# Patient Record
(demographics unavailable — no encounter records)

---
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
## 2022-07-07 NOTE — EVENT NOTE
Date: 07/07/22





Patient reassessed today and is nonverbal sitting in a chair.  Does not respond 

to questioning.  We will keep 1013 in place.

## 2022-07-08 NOTE — ELECTROCARDIOGRAPH REPORT
[facility]

                                       

Test Date:    2022               Test Time:    10:16:39

Pat Name:     ANTONIO TURNER           Department:   

Patient ID:   [id number]          Room:          

Gender:       F                        Technician:   NURSE

:          1963               Requested By: LARRY GRAY

Order Number: [id number]              Reading MD:   Stephanie Grullon

                                 Measurements

Intervals                              Axis          

Rate:         78                       P:            54

CT:           184                      QRS:          -39

QRSD:         92                       T:            4

QT:           402                                    

QTc:          460                                    

                           Interpretive Statements

Sinus rhythm

Left axis deviation

Anteroseptal infarct, age indeterminate

No previous ECG available for comparison

Electronically Signed On 2022 17:13:35 EDT by Stephanie Grullon
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
## 2022-07-08 NOTE — CONSULTATION
History of Present Illness





- Reason for Consult


Consult date: 22


Medical Management


Requesting physician: SOSA QUINTERO





- History of Present Illness


60 YO Female with Obesity Hypoventilation Syndrome, HTN, DM, Bipolar Disorder, 

Schizophrenia, Schizoaffective Disorder Consult placed by Dr. Quintero for 

medical management.  Patient seen and evaluated in the recreation room.  Patient

denies fever, chills, chest pain, palpitation, productive cough, skin rash, rec

ent contact, known exposure to COVID-19.  Patient appears to be at baseline 

level of cognition and function.











Past History


Past Medical History: diabetes, hypertension, other (see hpi)


Past Surgical History: 


Social history: single.  denies: smoking, alcohol abuse, prescription drug abuse


Family history: diabetes, hypertension





Medications and Allergies


                                    Allergies











Allergy/AdvReac Type Severity Reaction Status Date / Time


 


No Known Allergies Allergy   Verified 22 11:13











                                Home Medications











 Medication  Instructions  Recorded  Confirmed  Last Taken  Type


 


Unobtainable  22 Unknown History











Active Meds: 


Active Medications





Insulin Human Lispro (Insulin Lispro 100 Unit/Ml)  0 unit SUB-Q ACHS [facility]; 

Protocol











Review of Systems


Constitutional: no weight loss, no weight gain, no fever, no chills


Ears, nose, mouth and throat: no ear pain, no ear discharge, no decreased 

hearing, no nose pain, no nasal discharge


Breasts: no change in shape, no swelling, no mass


Cardiovascular: no chest pain, no orthopnea, no rapid/irregular heart beat, no 

edema, no syncope


Respiratory: no cough, no excessive sputum, no hemoptysis, no shortness of 

breath, no dyspnea on exertion


Gastrointestinal: no nausea, no vomiting


Genitourinary Female: no pelvic pain, no flank pain, no dysuria, no urinary 

frequency, no urgency


Rectal: no pain, no incontinence, no bleeding


Musculoskeletal: no neck stiffness, no neck pain, no arm numbness/tingling


Integumentary: no rash, no pruritis, no sores


Neurological: no head injury, no transient paralysis, no parathesias, no 

numbness, no seizures, no syncope


Psychiatric: irritability, mood swings, no disorientation, no hallucinations


Endocrine: no cold intolerance, no heat intolerance, no excessive thirst, no 

polydipsia, no excessive sweating


Hematologic/Lymphatic: no easy bruising, no easy bleeding, no lymphadenopathy


Allergic/Immunologic: no urticaria, no angioedema





Exam





- Constitutional


Vitals: 


                                        











Temp Pulse Resp BP Pulse Ox


 


 99.3 F   79   18   131/75   92 


 


 22 09:16  22 09:16  22 09:16  22 09:16  22 09:16











General appearance: Present: obese





- EENT


Eyes: Present: PERRL


ENT: hearing intact, clear oral mucosa





- Neck


Neck: Present: supple, normal ROM





- Respiratory


Respiratory effort: normal


Respiratory: bilateral: CTA





- Cardiovascular


Heart Sounds: Present: S1 & S2.  Absent: rub, click





- Extremities


Extremities: pulses symmetrical, No edema


Peripheral Pulses: within normal limits





- Abdominal


General gastrointestinal: Present: soft, non-tender, non-distended, normal bowel

sounds


Female genitourinary: Present: normal





- Integumentary


Integumentary: Present: clear, warm, dry





- Musculoskeletal


Musculoskeletal: gait normal, strength equal bilaterally





- Psychiatric


Psychiatric: cooperative





- Neurologic


Neurologic: CNII-XII intact, moves all extremities





Results





- Labs


CBC & Chem 7: 


                                 22 06:16





                                 22 06:16


Labs: 


                              Abnormal lab results











  22 Range/Units





  06:16 06:16 06:16 


 


WBC  4.1 L    (4.5-11.0)  K/mm3


 


Lymph % (Auto)  35.4 H    (13.4-35.0)  %


 


Mono % (Auto)  8.9 H    (0.0-7.3)  %


 


Glucose   206 H   ()  mg/dL


 


POC Glucose     ()  mg/dL


 


Hemoglobin A1c    9.1 H  (4-6)  %


 


HDL Cholesterol   37 L   (40-59)  mg/dL














  22 Range/Units





  06:16 11:26 


 


WBC    (4.5-11.0)  K/mm3


 


Lymph % (Auto)    (13.4-35.0)  %


 


Mono % (Auto)    (0.0-7.3)  %


 


Glucose    ()  mg/dL


 


POC Glucose  206 H  221 H  ()  mg/dL


 


Hemoglobin A1c    (4-6)  %


 


HDL Cholesterol    (40-59)  mg/dL














Assessment and Plan





- Patient Problems


(1) HTN (hypertension)


Current Visit: Yes   Status: Acute   


Qualifiers: 


   Hypertension type: primary hypertension   Qualified Code(s): I10 - Essential 

(primary) hypertension   


Plan to address problem: 


Monitor blood pressure every shift, continue medical management.








(2) Diabetes


Current Visit: Yes   Status: Acute   


Plan to address problem: 


Consistent carbohydrate diet, Accu-Chek, insulin protocol, hypoglycemia 

protocol.








(3) Bipolar 1 disorder


Current Visit: Yes   Status: Acute   


Plan to address problem: 


Continue medical management, supportive care, behavior change counseling,








(4) Schizophrenia


Current Visit: Yes   Status: Acute   


Qualifiers: 


   Schizophrenia type: unspecified   Qualified Code(s): F20.9 - Schizophrenia, 

unspecified   


Plan to address problem: 


Continue medical management, supportive care.








(5) Advance care planning


Current Visit: Yes   Status: Acute   


Plan to address problem: 


Disease education done, care plan discussed, diagnoses discussed, prognosis 

discussed, patient is full code.  Patient acknowledges understanding and 

agreement with care plan, +30 minutes.








(6) Preventative health care


Current Visit: Yes   Status: Acute   


Plan to address problem: 


Patient counseled regarding balanced diet, increase physical activity at 

discharge, consistent carbohydrate diet, weight reduction, patient instructed to

follow-up with primary care physician regarding all age and risk factor 

appropriate screening test.  +30 minutes.

## 2022-07-09 NOTE — PROGRESS NOTE
Subjective


Date of service: 07/09/22


Principal diagnosis: Bipolar, schizophrenia


Subjective Comment: 





Date of service: 07/09/22


Principal diagnosis: Bipolar, schizophrenia


Subjective Comment:





 Patient seen today in her room. Patient states that she will not take her 

medications because "Zack told me not to". Patient is also refusing to have her

blood sugar checked, and has been aggressive towards the staff. When asked why 

she wasnt taking her meds she replied " Not taking my medication satan". After 

that she refused to answer any more questions. No new changes at this time.





Medications and Allergies


                                    Allergies











Allergy/AdvReac Type Severity Reaction Status Date / Time


 


No Known Allergies Allergy   Verified 07/06/22 11:13











                                Home Medications











 Medication  Instructions  Recorded  Confirmed  Last Taken  Type


 


Bupropion HCl [Wellbutrin XL] 300 mg PO QAM 07/08/22 07/08/22 Unknown History


 


Divalproex Dr [DepaKOTE DR] 500 mg PO QA 07/08/22 07/08/22 Unknown History


 


Doxepin HCl [Silenor] 6 mg PO QHS 07/08/22 07/08/22 Unknown History


 


FLUoxetine [PROzac] 20 mg PO QDAY 07/08/22 07/08/22 Unknown History


 


diphenhydrAMINE HCL [Allergy] 25 mg PO HS 07/08/22 07/08/22 Unknown History


 


metFORMIN [Glucophage] 500 mg PO BID 07/08/22 07/08/22 Unknown History


 


propranoloL [Inderal] 10 mg PO BID 07/08/22 07/08/22 Unknown History


 


risperiDONE [RisperDAL] 2 mg PO BID 07/08/22 07/08/22 Unknown History


 


traZODone [Desyrel] 100 mg PO QHS 07/08/22 07/08/22 Unknown History











Active Meds: 


Active Medications





Bupropion HCl (Bupropion Xl 150 Mg Tab)  300 mg PO QDAY [facility]


   Last Admin: 07/09/22 10:30 Dose:  Not Given


   


Divalproex Sodium (Divalproex Dr 500 Mg Tab)  [facility]


   Last Admin: 07/09/22 10:30 Dose:  Not Given


   


Fluoxetine HCl (Fluoxetine 20 Mg Cap)  20 mg PO QDAY [facility]


   Last Admin: 07/09/22 10:30 Dose:  Not Given


   


Haloperidol Lactate (Haloperidol Lactate 5 Mg/1 Ml Inj)  5 mg IM Q6H PRN


   PRN Reason: Agitation


   Last Admin: 07/09/22 16:40 Dose:  5 mg


   


Insulin Human Lispro (Insulin Lispro 100 Unit/Ml)  0 unit SUB-Q ACHS [facility]; 

Protocol


   Last Admin: 07/09/22 16:46 Dose:  Not Given


   


Lorazepam (Lorazepam 2 Mg/Ml Vial)  2 mg IM Q6H PRN


   PRN Reason: Agitation


   Last Admin: 07/09/22 16:42 Dose:  2 mg


   


Metformin HCl (Metformin 500 Mg Tab)  500 mg PO BIDDIAB [facility]


   Last Admin: 07/09/22 16:46 Dose:  Not Given


   


Propranolol HCl (Propranolol 10 Mg Tab)  10 mg PO Q12HR [facility]


   Last Admin: 07/09/22 10:30 Dose:  Not Given


   


Risperidone (Risperidone 1 Mg Tab)  2 mg PO BID [facility]


   Last Admin: 07/09/22 10:30 Dose:  Not Given


   


Trazodone HCl (Trazodone 50 Mg Tab)  50 mg PO QHS PRN


   PRN Reason: Anxiety


Trazodone HCl (Trazodone 100 Mg Tab)  100 mg PO QHS [facility]


   Last Admin: 07/08/22 21:38 Dose:  Not Given


   











Results





- Results


Labs/Vitals: 


                             Laboratory Last Values











WBC  4.1 K/mm3 (4.5-11.0)  L  07/08/22  06:16    


 


RBC  4.51 M/mm3 (3.65-5.03)   07/08/22  06:16    


 


Hgb  13.4 gm/dl (10.1-14.3)   07/08/22  06:16    


 


Hct  40.9 % (30.3-42.9)   07/08/22  06:16    


 


MCV  91 fl (79-97)   07/08/22  06:16    


 


MCH  30 pg (28-32)   07/08/22  06:16    


 


MCHC  33 % (30-34)   07/08/22  06:16    


 


RDW  13.3 % (13.2-15.2)   07/08/22  06:16    


 


Plt Count  172 K/mm3 (140-440)   07/08/22  06:16    


 


Lymph % (Auto)  35.4 % (13.4-35.0)  H  07/08/22  06:16    


 


Mono % (Auto)  8.9 % (0.0-7.3)  H  07/08/22  06:16    


 


Eos % (Auto)  2.2 % (0.0-4.3)   07/08/22  06:16    


 


Baso % (Auto)  1.2 % (0.0-1.8)   07/08/22  06:16    


 


Lymph # (Auto)  1.4 K/mm3 (1.2-5.4)   07/08/22  06:16    


 


Mono # (Auto)  0.4 K/mm3 (0.0-0.8)   07/08/22  06:16    


 


Eos # (Auto)  0.1 K/mm3 (0.0-0.4)   07/08/22  06:16    


 


Baso # (Auto)  0.0 K/mm3 (0.0-0.1)   07/08/22  06:16    


 


Seg Neutrophils %  52.3 % (40.0-70.0)   07/08/22  06:16    


 


Seg Neutrophils #  2.1 K/mm3 (1.8-7.7)   07/08/22  06:16    


 


Sodium  138 mmol/L (137-145)   07/08/22  06:16    


 


Potassium  3.8 mmol/L (3.6-5.0)   07/08/22  06:16    


 


Chloride  103.7 mmol/L ()   07/08/22  06:16    


 


Carbon Dioxide  24 mmol/L (22-30)   07/08/22  06:16    


 


Anion Gap  14 mmol/L  07/08/22  06:16    


 


BUN  11 mg/dL (7-17)   07/08/22  06:16    


 


Creatinine  0.6 mg/dL (0.6-1.2)   07/08/22  06:16    


 


Estimated GFR  > 60 ml/min  07/08/22  06:16    


 


BUN/Creatinine Ratio  18 %  07/08/22  06:16    


 


Glucose  206 mg/dL ()  H  07/08/22  06:16    


 


POC Glucose  197 mg/dL ()  H  07/09/22  10:03    


 


Hemoglobin A1c  9.1 % (4-6)  H  07/08/22  06:16    


 


Calcium  8.9 mg/dL (8.4-10.2)   07/08/22  06:16    


 


Total Bilirubin  0.60 mg/dL (0.1-1.2)   07/08/22  06:16    


 


AST  20 units/L (5-40)   07/08/22  06:16    


 


ALT  27 units/L (7-56)   07/08/22  06:16    


 


Alkaline Phosphatase  47 units/L ()   07/08/22  06:16    


 


Total Protein  6.4 g/dL (6.3-8.2)   07/08/22  06:16    


 


Albumin  4.3 g/dL (3.9-5)   07/08/22  06:16    


 


Albumin/Globulin Ratio  2.0 %  07/08/22  06:16    


 


Triglycerides  142 mg/dL (2-149)   07/08/22  06:16    


 


Cholesterol  144 mg/dL ()   07/08/22  06:16    


 


LDL Cholesterol Direct  96 mg/dL ()   07/08/22  06:16    


 


HDL Cholesterol  37 mg/dL (40-59)  L  07/08/22  06:16    


 


Cholesterol/HDL Ratio  3.89 %  07/08/22  06:16    


 


TSH  0.672 mlU/mL (0.270-4.200)   07/08/22  06:16    








                                Last Vital Signs











Temp  99.3 F   07/08/22 09:16


 


Pulse  79   07/08/22 09:16


 


Resp  18   07/08/22 09:16


 


BP  131/75   07/08/22 09:16


 


Pulse Ox  92   07/08/22 09:16
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
## 2022-07-10 NOTE — PROGRESS NOTE
Assessment and Plan





- Patient Problems


(1) HTN (hypertension)


Current Visit: Yes   Status: Acute   


Qualifiers: 


   Hypertension type: primary hypertension   Qualified Code(s): I10 - Essential 

(primary) hypertension   


Plan to address problem: 


Monitor blood pressure every shift, continue medical management.








(2) Diabetes


Current Visit: Yes   Status: Acute   


Plan to address problem: 


Consistent carbohydrate diet, Accu-Chek, insulin protocol, hypoglycemia 

protocol.








(3) Bipolar 1 disorder


Current Visit: Yes   Status: Acute   


Plan to address problem: 


Continue medical management, supportive care, behavior change counseling,








(4) Schizophrenia


Current Visit: Yes   Status: Acute   


Qualifiers: 


   Schizophrenia type: unspecified   Qualified Code(s): F20.9 - Schizophrenia, 

unspecified   


Plan to address problem: 


Continue medical management, supportive care.








(5) Advance care planning


Current Visit: Yes   Status: Acute   


Plan to address problem: 


Disease education done, care plan discussed, diagnoses discussed, prognosis 

discussed, patient is full code.  Patient acknowledges understanding and 

agreement with care plan, +30 minutes.








(6) Preventative health care


Current Visit: Yes   Status: Acute   


Plan to address problem: 


Patient counseled regarding balanced diet, increase physical activity at 

discharge, consistent carbohydrate diet, weight reduction, patient instructed to

follow-up with primary care physician regarding all age and risk factor 

appropriate screening test.  +30 minutes.








History


Interval history: 


60 YO Female with Obesity Hypoventilation Syndrome, HTN, DM, Bipolar Disorder, 

Schizophrenia, Schizoaffective Disorder Consult placed by Dr. Long for 

medical management.  Patient seen and evaluated in the recreation room.  Patient

appears to be at baseline level of cognition and function.














Hospitalist Physical





- Constitutional


Vitals: 


                                        











Temp Pulse Resp BP Pulse Ox


 


 98.6 F   79   18   145/82   97 


 


 07/10/22 08:58  07/10/22 08:58  07/10/22 08:58  07/10/22 08:58  07/10/22 08:58











General appearance: Present: no acute distress, obese





- EENT


Eyes: Present: PERRL


ENT: hearing intact





- Neck


Neck: Present: supple





- Respiratory


Respiratory effort: normal


Respiratory: bilateral: CTA





- Cardiovascular


Rhythm: regular


Heart Sounds: Present: S1 & S2





- Extremities


Extremities: no ischemia


Peripheral Pulses: within normal limits





- Abdominal


General gastrointestinal: soft, non-tender, non-distended





- Integumentary


Integumentary: Present: clear, dry





- Psychiatric


Psychiatric: cooperative





- Neurologic


Neurologic: CNII-XII intact





Results





- Labs


CBC & Chem 7: 


                                 07/08/22 06:16





                                 07/08/22 06:16


Labs: 


                             Laboratory Last Values











WBC  4.1 K/mm3 (4.5-11.0)  L  07/08/22  06:16    


 


RBC  4.51 M/mm3 (3.65-5.03)   07/08/22  06:16    


 


Hgb  13.4 gm/dl (10.1-14.3)   07/08/22  06:16    


 


Hct  40.9 % (30.3-42.9)   07/08/22  06:16    


 


MCV  91 fl (79-97)   07/08/22  06:16    


 


MCH  30 pg (28-32)   07/08/22  06:16    


 


MCHC  33 % (30-34)   07/08/22  06:16    


 


RDW  13.3 % (13.2-15.2)   07/08/22  06:16    


 


Plt Count  172 K/mm3 (140-440)   07/08/22  06:16    


 


Lymph % (Auto)  35.4 % (13.4-35.0)  H  07/08/22  06:16    


 


Mono % (Auto)  8.9 % (0.0-7.3)  H  07/08/22  06:16    


 


Eos % (Auto)  2.2 % (0.0-4.3)   07/08/22  06:16    


 


Baso % (Auto)  1.2 % (0.0-1.8)   07/08/22  06:16    


 


Lymph # (Auto)  1.4 K/mm3 (1.2-5.4)   07/08/22  06:16    


 


Mono # (Auto)  0.4 K/mm3 (0.0-0.8)   07/08/22  06:16    


 


Eos # (Auto)  0.1 K/mm3 (0.0-0.4)   07/08/22  06:16    


 


Baso # (Auto)  0.0 K/mm3 (0.0-0.1)   07/08/22  06:16    


 


Seg Neutrophils %  52.3 % (40.0-70.0)   07/08/22  06:16    


 


Seg Neutrophils #  2.1 K/mm3 (1.8-7.7)   07/08/22  06:16    


 


Sodium  138 mmol/L (137-145)   07/08/22  06:16    


 


Potassium  3.8 mmol/L (3.6-5.0)   07/08/22  06:16    


 


Chloride  103.7 mmol/L ()   07/08/22  06:16    


 


Carbon Dioxide  24 mmol/L (22-30)   07/08/22  06:16    


 


Anion Gap  14 mmol/L  07/08/22  06:16    


 


BUN  11 mg/dL (7-17)   07/08/22  06:16    


 


Creatinine  0.6 mg/dL (0.6-1.2)   07/08/22  06:16    


 


Estimated GFR  > 60 ml/min  07/08/22  06:16    


 


BUN/Creatinine Ratio  18 %  07/08/22  06:16    


 


Glucose  206 mg/dL ()  H  07/08/22  06:16    


 


POC Glucose  197 mg/dL ()  H  07/09/22  10:03    


 


Hemoglobin A1c  9.1 % (4-6)  H  07/08/22  06:16    


 


Calcium  8.9 mg/dL (8.4-10.2)   07/08/22  06:16    


 


Total Bilirubin  0.60 mg/dL (0.1-1.2)   07/08/22  06:16    


 


AST  20 units/L (5-40)   07/08/22  06:16    


 


ALT  27 units/L (7-56)   07/08/22  06:16    


 


Alkaline Phosphatase  47 units/L ()   07/08/22  06:16    


 


Total Protein  6.4 g/dL (6.3-8.2)   07/08/22  06:16    


 


Albumin  4.3 g/dL (3.9-5)   07/08/22  06:16    


 


Albumin/Globulin Ratio  2.0 %  07/08/22  06:16    


 


Triglycerides  142 mg/dL (2-149)   07/08/22  06:16    


 


Cholesterol  144 mg/dL ()   07/08/22  06:16    


 


LDL Cholesterol Direct  96 mg/dL ()   07/08/22  06:16    


 


HDL Cholesterol  37 mg/dL (40-59)  L  07/08/22  06:16    


 


Cholesterol/HDL Ratio  3.89 %  07/08/22  06:16    


 


TSH  0.672 mlU/mL (0.270-4.200)   07/08/22  06:16    











Choe/IV: 


                                        





Voiding Method                   Toilet











Active Medications





- Current Medications


Current Medications: 














Generic Name Dose Route Start Last Admin





  Trade Name Freq  PRN Reason Stop Dose Admin


 


Bupropion HCl  300 mg  07/09/22 10:00  07/10/22 11:00





  Bupropion Xl 150 Mg Tab  PO   Not Given





  QDAY YESSICA  


 


Divalproex Sodium  500 mg  07/09/22 10:00  07/10/22 11:00





  Divalproex Dr 500 Mg Tab  PO   Not Given





  QAM YESSICA  


 


Fluoxetine HCl  20 mg  07/09/22 10:00  07/10/22 11:00





  Fluoxetine 20 Mg Cap  PO   Not Given





  QDAY YESSICA  


 


Haloperidol Lactate  5 mg  07/08/22 20:01  07/10/22 15:00





  Haloperidol Lactate 5 Mg/1 Ml Inj  IM   5 mg





  Q6H PRN   Administration





  Agitation  


 


Insulin Human Lispro  0 unit  07/08/22 07:30  07/10/22 17:01





  Insulin Lispro 100 Unit/Ml  SUB-Q   Not Given





  ACHS YESSICA  





  Protocol  


 


Lorazepam  2 mg  07/08/22 19:59  07/10/22 15:00





  Lorazepam 2 Mg/Ml Vial  IM   2 mg





  Q6H PRN   Administration





  Agitation  


 


Metformin HCl  500 mg  07/09/22 08:00  07/10/22 17:04





  Metformin 500 Mg Tab  PO   Not Given





  BIDDIAB YESSICA  


 


Propranolol HCl  10 mg  07/08/22 22:00  07/10/22 11:00





  Propranolol 10 Mg Tab  PO   Not Given





  Q12HR YESSICA  


 


Risperidone  2 mg  07/08/22 22:00  07/10/22 11:00





  Risperidone 1 Mg Tab  PO   Not Given





  BID YESSICA  


 


Trazodone HCl  50 mg  07/08/22 19:25 





  Trazodone 50 Mg Tab  PO  





  QHS PRN  





  Anxiety  


 


Trazodone HCl  100 mg  07/08/22 22:00  07/09/22 22:25





  Trazodone 100 Mg Tab  PO   Not Given





  QHS YESSICA  


 


Ziprasidone  20 mg  07/10/22 17:00 





  Ziprasidone Mesylate 20 Mg Vial  IM  





  Q8H PRN  





  Agitation

## 2022-07-10 NOTE — PROGRESS NOTE
Assessment and Plan





- Patient Problems


(1) HTN (hypertension)


Current Visit: Yes   Status: Acute   


Qualifiers: 


   Hypertension type: primary hypertension   Qualified Code(s): I10 - Essential 

(primary) hypertension   


Plan to address problem: 


Monitor blood pressure every shift, continue medical management.








(2) Diabetes


Current Visit: Yes   Status: Acute   


Plan to address problem: 


Consistent carbohydrate diet, Accu-Chek, insulin protocol, hypoglycemia 

protocol.








(3) Bipolar 1 disorder


Current Visit: Yes   Status: Acute   


Plan to address problem: 


Continue medical management, supportive care, behavior change counseling,








(4) Schizophrenia


Current Visit: Yes   Status: Acute   


Qualifiers: 


   Schizophrenia type: unspecified   Qualified Code(s): F20.9 - Schizophrenia, 

unspecified   


Plan to address problem: 


Continue medical management, supportive care.








(5) Advance care planning


Current Visit: Yes   Status: Acute   


Plan to address problem: 


Disease education done, care plan discussed, diagnoses discussed, prognosis 

discussed, patient is full code.  Patient acknowledges understanding and 

agreement with care plan, +30 minutes.








(6) Preventative health care


Current Visit: Yes   Status: Acute   


Plan to address problem: 


Patient counseled regarding balanced diet, increase physical activity at 

discharge, consistent carbohydrate diet, weight reduction, patient instructed to

follow-up with primary care physician regarding all age and risk factor 

appropriate screening test.  +30 minutes.








History


Interval history: 


58 YO Female with Obesity Hypoventilation Syndrome, HTN, DM, Bipolar Disorder, 

Schizophrenia, Schizoaffective Disorder Consult placed by Dr. Long for 

medical management.  Patient seen and evaluated in the recreation room.  Patient

appears to be at baseline level of cognition and function.














Hospitalist Physical





- Constitutional


Vitals: 


                                        











Temp Pulse Resp BP Pulse Ox


 


 98.6 F   79   18   145/82   97 


 


 07/10/22 08:58  07/10/22 08:58  07/10/22 08:58  07/10/22 08:58  07/10/22 08:58











General appearance: Present: no acute distress, obese





- EENT


Eyes: Present: PERRL


ENT: hearing intact





- Neck


Neck: Present: supple





- Respiratory


Respiratory effort: normal


Respiratory: bilateral: CTA





- Cardiovascular


Rhythm: regular


Heart Sounds: Present: S1 & S2





- Extremities


Extremities: no ischemia


Peripheral Pulses: within normal limits





- Abdominal


General gastrointestinal: soft, non-tender, non-distended





- Integumentary


Integumentary: Present: clear, dry





- Psychiatric


Psychiatric: cooperative





- Neurologic


Neurologic: CNII-XII intact





Results





- Labs


CBC & Chem 7: 


                                 07/08/22 06:16





                                 07/08/22 06:16


Labs: 


                             Laboratory Last Values











WBC  4.1 K/mm3 (4.5-11.0)  L  07/08/22  06:16    


 


RBC  4.51 M/mm3 (3.65-5.03)   07/08/22  06:16    


 


Hgb  13.4 gm/dl (10.1-14.3)   07/08/22  06:16    


 


Hct  40.9 % (30.3-42.9)   07/08/22  06:16    


 


MCV  91 fl (79-97)   07/08/22  06:16    


 


MCH  30 pg (28-32)   07/08/22  06:16    


 


MCHC  33 % (30-34)   07/08/22  06:16    


 


RDW  13.3 % (13.2-15.2)   07/08/22  06:16    


 


Plt Count  172 K/mm3 (140-440)   07/08/22  06:16    


 


Lymph % (Auto)  35.4 % (13.4-35.0)  H  07/08/22  06:16    


 


Mono % (Auto)  8.9 % (0.0-7.3)  H  07/08/22  06:16    


 


Eos % (Auto)  2.2 % (0.0-4.3)   07/08/22  06:16    


 


Baso % (Auto)  1.2 % (0.0-1.8)   07/08/22  06:16    


 


Lymph # (Auto)  1.4 K/mm3 (1.2-5.4)   07/08/22  06:16    


 


Mono # (Auto)  0.4 K/mm3 (0.0-0.8)   07/08/22  06:16    


 


Eos # (Auto)  0.1 K/mm3 (0.0-0.4)   07/08/22  06:16    


 


Baso # (Auto)  0.0 K/mm3 (0.0-0.1)   07/08/22  06:16    


 


Seg Neutrophils %  52.3 % (40.0-70.0)   07/08/22  06:16    


 


Seg Neutrophils #  2.1 K/mm3 (1.8-7.7)   07/08/22  06:16    


 


Sodium  138 mmol/L (137-145)   07/08/22  06:16    


 


Potassium  3.8 mmol/L (3.6-5.0)   07/08/22  06:16    


 


Chloride  103.7 mmol/L ()   07/08/22  06:16    


 


Carbon Dioxide  24 mmol/L (22-30)   07/08/22  06:16    


 


Anion Gap  14 mmol/L  07/08/22  06:16    


 


BUN  11 mg/dL (7-17)   07/08/22  06:16    


 


Creatinine  0.6 mg/dL (0.6-1.2)   07/08/22  06:16    


 


Estimated GFR  > 60 ml/min  07/08/22  06:16    


 


BUN/Creatinine Ratio  18 %  07/08/22  06:16    


 


Glucose  206 mg/dL ()  H  07/08/22  06:16    


 


POC Glucose  197 mg/dL ()  H  07/09/22  10:03    


 


Hemoglobin A1c  9.1 % (4-6)  H  07/08/22  06:16    


 


Calcium  8.9 mg/dL (8.4-10.2)   07/08/22  06:16    


 


Total Bilirubin  0.60 mg/dL (0.1-1.2)   07/08/22  06:16    


 


AST  20 units/L (5-40)   07/08/22  06:16    


 


ALT  27 units/L (7-56)   07/08/22  06:16    


 


Alkaline Phosphatase  47 units/L ()   07/08/22  06:16    


 


Total Protein  6.4 g/dL (6.3-8.2)   07/08/22  06:16    


 


Albumin  4.3 g/dL (3.9-5)   07/08/22  06:16    


 


Albumin/Globulin Ratio  2.0 %  07/08/22  06:16    


 


Triglycerides  142 mg/dL (2-149)   07/08/22  06:16    


 


Cholesterol  144 mg/dL ()   07/08/22  06:16    


 


LDL Cholesterol Direct  96 mg/dL ()   07/08/22  06:16    


 


HDL Cholesterol  37 mg/dL (40-59)  L  07/08/22  06:16    


 


Cholesterol/HDL Ratio  3.89 %  07/08/22  06:16    


 


TSH  0.672 mlU/mL (0.270-4.200)   07/08/22  06:16    











Choe/IV: 


                                        





Voiding Method                   Toilet











Active Medications





- Current Medications


Current Medications: 














Generic Name Dose Route Start Last Admin





  Trade Name Freq  PRN Reason Stop Dose Admin


 


Bupropion HCl  300 mg  07/09/22 10:00  07/10/22 11:00





  Bupropion Xl 150 Mg Tab  PO   Not Given





  QDAY YESSICA  


 


Divalproex Sodium  500 mg  07/09/22 10:00  07/10/22 11:00





  Divalproex Dr 500 Mg Tab  PO   Not Given





  QAM YESSICA  


 


Fluoxetine HCl  20 mg  07/09/22 10:00  07/10/22 11:00





  Fluoxetine 20 Mg Cap  PO   Not Given





  QDAY YESSICA  


 


Haloperidol Lactate  5 mg  07/08/22 20:01  07/10/22 15:00





  Haloperidol Lactate 5 Mg/1 Ml Inj  IM   5 mg





  Q6H PRN   Administration





  Agitation  


 


Insulin Human Lispro  0 unit  07/08/22 07:30  07/10/22 17:01





  Insulin Lispro 100 Unit/Ml  SUB-Q   Not Given





  ACHS YESSICA  





  Protocol  


 


Lorazepam  2 mg  07/08/22 19:59  07/10/22 15:00





  Lorazepam 2 Mg/Ml Vial  IM   2 mg





  Q6H PRN   Administration





  Agitation  


 


Metformin HCl  500 mg  07/09/22 08:00  07/10/22 17:04





  Metformin 500 Mg Tab  PO   Not Given





  BIDDIAB YESSICA  


 


Propranolol HCl  10 mg  07/08/22 22:00  07/10/22 11:00





  Propranolol 10 Mg Tab  PO   Not Given





  Q12HR YESSICA  


 


Risperidone  2 mg  07/08/22 22:00  07/10/22 11:00





  Risperidone 1 Mg Tab  PO   Not Given





  BID YESSICA  


 


Trazodone HCl  50 mg  07/08/22 19:25 





  Trazodone 50 Mg Tab  PO  





  QHS PRN  





  Anxiety  


 


Trazodone HCl  100 mg  07/08/22 22:00  07/09/22 22:25





  Trazodone 100 Mg Tab  PO   Not Given





  QHS YESSICA  


 


Ziprasidone  20 mg  07/10/22 17:00 





  Ziprasidone Mesylate 20 Mg Vial  IM  





  Q8H PRN  





  Agitation
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
## 2022-07-11 NOTE — PROGRESS NOTE
Assessment and Plan





58 YO Female with Obesity Hypoventilation Syndrome, HTN, DM, Bipolar Disorder, 

Schizophrenia, Schizoaffective Disorder Consult placed by Dr. Long for 

medical management.  Patient seen and evaluated in the recreation room.  Patient

appears to be at baseline level of cognition and function.





(1) HTN (hypertension)


Current Visit: Yes   Status: Acute   


Qualifiers: 


   Hypertension type: primary hypertension   Qualified Code(s): I10 - Essential 

(primary) hypertension   


Plan to address problem: 


Monitor blood pressure every shift, continue medical management.








(2) Diabetes


Current Visit: Yes   Status: Acute   


Plan to address problem: 


Consistent carbohydrate diet, Accu-Chek, insulin protocol, hypoglycemia 

protocol.








(3) Bipolar 1 disorder


Current Visit: Yes   Status: Acute   


Plan to address problem: 


Continue medical management, supportive care, behavior change counseling,








(4) Schizophrenia


Current Visit: Yes   Status: Acute   


Qualifiers: 


   Schizophrenia type: unspecified   Qualified Code(s): F20.9 - Schizophrenia, 

unspecified   


Plan to address problem: 


Continue medical management, supportive care.











Subjective


Date of service: 07/11/22


Principal diagnosis: Bipolar, schizophrenia


Interval history: 





Patient seen and examined.  Medical records and medication list reviewed.  


No acute event overnight noted by the RN.  


Patient denies any chest pain or difficulty breathing.  Patient is tolerating 

diet.  











Objective





- Exam


Narrative Exam: 





General appearance: Present: no acute distress, obese





- EENT


Eyes: Present: PERRL


ENT: hearing intact





- Neck


Neck: Present: supple





- Respiratory


Respiratory effort: normal


Respiratory: bilateral: CTA





- Cardiovascular


Rhythm: regular


Heart Sounds: Present: S1 & S2





- Extremities


Extremities: no ischemia


Peripheral Pulses: within normal limits





- Abdominal


General gastrointestinal: soft, non-tender, non-distended





- Integumentary


Integumentary: Present: clear, dry





- Psychiatric


Psychiatric: cooperative





- Neurologic


Neurologic: CNII-XII intact








- Labs


CBC & Chem 7: 


                                 07/08/22 06:16





                                 07/08/22 06:16
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
## 2022-07-15 NOTE — PROGRESS NOTE
Assessment and Plan


Assessment and plan: 


60 YO Female with Obesity Hypoventilation Syndrome, HTN, DM, Bipolar Disorder, 

Schizophrenia, Schizoaffective Disorder Consult placed by Dr. Long for 

medical management.  Patient seen and evaluated in the recreation room.  Patient

appears to be at baseline level of cognition and function.





(1) HTN (hypertension)


Current Visit: Yes   Status: Acute   


Qualifiers: 


   Hypertension type: primary hypertension   Qualified Code(s): I10 - Essential 

(primary) hypertension   


Plan to address problem: 


Monitor blood pressure every shift, continue medical management.








(2) Diabetes


Current Visit: Yes   Status: Acute   


Plan to address problem: 


Consistent carbohydrate diet, Accu-Chek, insulin protocol, hypoglycemia 

protocol.








(3) Bipolar 1 disorder


Current Visit: Yes   Status: Acute   


Plan to address problem: 


Continue medical management, supportive care, behavior change counseling,








(4) Schizophrenia


Current Visit: Yes   Status: Acute   


Qualifiers: 


   Schizophrenia type: unspecified   Qualified Code(s): F20.9 - Schizophrenia, 

unspecified   


Plan to address problem: 


Continue medical management, supportive care.




















History


Interval history: 


Patient seen and examined.  No acute event overnight noted by the RN.  


Patient denies any chest pain or difficulty breathing.  Patient is tolerating 

diet.  








Hospitalist Physical





- Physical exam


Narrative exam: 


- Exam


Narrative Exam: 





General appearance: Present: no acute distress, obese





- EENT


Eyes: Present: PERRL


ENT: hearing intact





- Neck


Neck: Present: supple





- Respiratory


Respiratory effort: normal


Respiratory: bilateral: CTA





- Cardiovascular


Rhythm: regular


Heart Sounds: Present: S1 & S2





- Extremities


Extremities: no ischemia


Peripheral Pulses: within normal limits





- Abdominal


General gastrointestinal: soft, non-tender, non-distended





- Integumentary


Integumentary: Present: clear, dry





- Psychiatric


Psychiatric: cooperative





- Neurologic


Neurologic: CNII-XII intact











- Constitutional


Vitals: 


                                        











Temp Pulse Resp BP Pulse Ox


 


 97.4 F L  76   16   127/80   100 


 


 07/14/22 19:21  07/14/22 19:21  07/14/22 19:21  07/14/22 19:21  07/14/22 19:21











General appearance: Present: no acute distress, obese





Results





- Labs


CBC & Chem 7: 


                                 07/08/22 06:16





                                 07/08/22 06:16


Labs: 


                             Laboratory Last Values











WBC  4.1 K/mm3 (4.5-11.0)  L  07/08/22  06:16    


 


RBC  4.51 M/mm3 (3.65-5.03)   07/08/22  06:16    


 


Hgb  13.4 gm/dl (10.1-14.3)   07/08/22  06:16    


 


Hct  40.9 % (30.3-42.9)   07/08/22  06:16    


 


MCV  91 fl (79-97)   07/08/22  06:16    


 


MCH  30 pg (28-32)   07/08/22  06:16    


 


MCHC  33 % (30-34)   07/08/22  06:16    


 


RDW  13.3 % (13.2-15.2)   07/08/22  06:16    


 


Plt Count  172 K/mm3 (140-440)   07/08/22  06:16    


 


Lymph % (Auto)  35.4 % (13.4-35.0)  H  07/08/22  06:16    


 


Mono % (Auto)  8.9 % (0.0-7.3)  H  07/08/22  06:16    


 


Eos % (Auto)  2.2 % (0.0-4.3)   07/08/22  06:16    


 


Baso % (Auto)  1.2 % (0.0-1.8)   07/08/22  06:16    


 


Lymph # (Auto)  1.4 K/mm3 (1.2-5.4)   07/08/22  06:16    


 


Mono # (Auto)  0.4 K/mm3 (0.0-0.8)   07/08/22  06:16    


 


Eos # (Auto)  0.1 K/mm3 (0.0-0.4)   07/08/22  06:16    


 


Baso # (Auto)  0.0 K/mm3 (0.0-0.1)   07/08/22  06:16    


 


Seg Neutrophils %  52.3 % (40.0-70.0)   07/08/22  06:16    


 


Seg Neutrophils #  2.1 K/mm3 (1.8-7.7)   07/08/22  06:16    


 


Sodium  138 mmol/L (137-145)   07/08/22  06:16    


 


Potassium  3.8 mmol/L (3.6-5.0)   07/08/22  06:16    


 


Chloride  103.7 mmol/L ()   07/08/22  06:16    


 


Carbon Dioxide  24 mmol/L (22-30)   07/08/22  06:16    


 


Anion Gap  14 mmol/L  07/08/22  06:16    


 


BUN  11 mg/dL (7-17)   07/08/22  06:16    


 


Creatinine  0.6 mg/dL (0.6-1.2)   07/08/22  06:16    


 


Estimated GFR  > 60 ml/min  07/08/22  06:16    


 


BUN/Creatinine Ratio  18 %  07/08/22  06:16    


 


Glucose  206 mg/dL ()  H  07/08/22  06:16    


 


POC Glucose  196 mg/dL ()  H  07/14/22  19:56    


 


Hemoglobin A1c  9.1 % (4-6)  H  07/08/22  06:16    


 


Calcium  8.9 mg/dL (8.4-10.2)   07/08/22  06:16    


 


Total Bilirubin  0.60 mg/dL (0.1-1.2)   07/08/22  06:16    


 


AST  20 units/L (5-40)   07/08/22  06:16    


 


ALT  27 units/L (7-56)   07/08/22  06:16    


 


Alkaline Phosphatase  47 units/L ()   07/08/22  06:16    


 


Total Protein  6.4 g/dL (6.3-8.2)   07/08/22  06:16    


 


Albumin  4.3 g/dL (3.9-5)   07/08/22  06:16    


 


Albumin/Globulin Ratio  2.0 %  07/08/22  06:16    


 


Triglycerides  142 mg/dL (2-149)   07/08/22  06:16    


 


Cholesterol  144 mg/dL ()   07/08/22  06:16    


 


LDL Cholesterol Direct  96 mg/dL ()   07/08/22  06:16    


 


HDL Cholesterol  37 mg/dL (40-59)  L  07/08/22  06:16    


 


Cholesterol/HDL Ratio  3.89 %  07/08/22  06:16    


 


TSH  0.672 mlU/mL (0.270-4.200)   07/08/22  06:16    











Choe/IV: 


                                        





Voiding Method                   Toilet











Active Medications





- Current Medications


Current Medications: 














Generic Name Dose Route Start Last Admin





  Trade Name Freq  PRN Reason Stop Dose Admin


 


Bupropion HCl  150 mg  07/13/22 10:00  07/15/22 09:42





  Bupropion Xl 150 Mg Tab  PO   150 mg





  QDAY YESSICA   Administration


 


Divalproex Sodium  1,000 mg  07/13/22 10:00  07/15/22 09:42





  Divalproex Dr 500 Mg Tab  PO   1,000 mg





  BID YESSICA   Administration


 


Fluoxetine HCl  20 mg  07/09/22 10:00  07/15/22 09:42





  Fluoxetine 20 Mg Cap  PO   20 mg





  QDAY YESSICA   Administration


 


Insulin Human Lispro  0 unit  07/08/22 07:30  07/15/22 16:39





  Insulin Lispro 100 Unit/Ml  SUB-Q   Not Given





  [facility]  





  Protocol  


 


Lorazepam  2 mg  07/08/22 19:59  07/15/22 00:27





  Lorazepam 2 Mg/Ml Vial  IM   2 mg





  Q6H PRN   Administration





  Agitation  


 


Metformin HCl  500 mg  07/09/22 08:00  07/15/22 17:29





  Metformin 500 Mg Tab  PO   500 mg





  BIDDIAB YESSICA   Administration


 


Propranolol HCl  10 mg  07/08/22 22:00  07/15/22 09:49





  Propranolol 10 Mg Tab  PO   Not Given





  Q12HR YESSICA  


 


Risperidone  2 mg  07/08/22 22:00  07/15/22 09:41





  Risperidone 1 Mg Tab  PO   2 mg





  BID YESSICA   Administration


 


Trazodone HCl  100 mg  07/08/22 22:00  07/14/22 21:48





  Trazodone 100 Mg Tab  PO   Not Given





  [facility]  














Nutrition/Malnutrition Assess





- Dietary Evaluation


Nutrition/Malnutrition Findings: 


                                        





Nutrition Notes                                            Start:  07/15/22 

19:14


Freq:                                                      Status: Active       




Protocol:                                                                       




 Document     07/15/22 19:14  ZAHRAA  (Rec: 07/15/22 19:22  [id number]  [id number])


 Nutrition Notes


     Need for Assessment generated from:         LOS


     Initial or Follow up                        Assessment


     Other Pertinent Diagnosis                   Bipolar Disorder,


                                                 Schizoaffective Disorder.


     Current Diet                                Cardiac/Consistent


                                                 Carbohydrates Diet (since B 07 /07).


     Labs/Tests                                  07/15: N/A.


     Pertinent Medications                       07/15: Nutritionally


                                                 unremarkable.


     Height                                      5 ft 7 in


     Weight                                      99.7 kg


     Ideal Body Weight (kg)                      61.36


     BMI                                         34.4


     Intake Prior to Admission                   Good


     Weight change and time frame                Pt denies having loss body


                                                 weight PTA.


     Weight Status                               Obese


     Subjective/Other Information                RD consult for LOS assessment.


                                                 Pt's PO intake of meals has


                                                 been Good, (100%) and well


                                                 tolerated, according to ADL


                                                 notes.


                                                 Pt is on Room Air, O2


                                                 saturation @ 100%, according


                                                 to Vital Signs notes.


                                                 Pt presents unspecified


                                                 bruises as signs of concern


                                                 for skin risk at this time,


                                                 accoreding to Physical


                                                 Assessment History notes.


     Percent of energy/protein needs met:        Prescribed Cardiac/Consistent


                                                 Carbohydrates Diet provides


                                                 for energy/protein needs (1,


                                                 977 Kcal/86 g) during LOS.


     Burn                                        Absent


     Trauma                                      Absent


     GI Symptoms                                 None


     Food Allergy                                No


     Skin Integrity/Comment                      Unspecified bruises.


     Current % PO                                Good (%)


     Minimum of two criteria                     No


     Fluid Accumulation                          N/A


     Reduced  Strength                       N/A (non-severe)


     Protein-Calorie Malnutrition                N\A


     #1


      Nutrition Diagnosis                        No nutrition diagnosis at this


                                                 time


     Is patient on ventilator?                   No


     Is Patient Ambulatory and/or Out of Bed     Yes


     REE-([facility]-ambulatory/OOB) [     2086.019


      [email or web address]]                               


     Kcal/Kg value to use for calculation        18


     Approximate Energy Requirements Using       1795


      kcal/Kg                                    


     Calculation Used for Recommendations        Kcal/kg


     Additional Notes                            Protein: 0.8-1 g/Kg AdjBW; 65-


                                                 81 g/day.


                                                 Fluids: 1 ml/Kcal, or as per


                                                 MD.


 Nutrition Intervention


     Change Diet Order:                          Continue Cardiac/Consistent


                                                 Carbohydrates Diet.


     Revisit per MD consult or patient           Sign Off


      request:                                   


     Additional Comments                         Continue monitoring food


                                                 tolerance, %PO intake of meals


                                                 , and BM.
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
## 2022-07-18 NOTE — PROGRESS NOTE
Assessment and Plan





- Patient Problems


(1) HTN (hypertension)


Current Visit: Yes   Status: Acute   


Qualifiers: 


   Hypertension type: primary hypertension   Qualified Code(s): I10 - Essential 

(primary) hypertension   


Plan to address problem: 


Monitor blood pressure every shift, continue medical management.








(2) Diabetes


Current Visit: Yes   Status: Acute   


Plan to address problem: 


Consistent carbohydrate diet, Accu-Chek, insulin protocol, hypoglycemia 

protocol.








(3) Bipolar 1 disorder


Current Visit: Yes   Status: Acute   


Plan to address problem: 


Continue medical management, supportive care, behavior change counseling,








(4) Schizophrenia


Current Visit: Yes   Status: Acute   


Qualifiers: 


   Schizophrenia type: unspecified   Qualified Code(s): F20.9 - Schizophrenia, 

unspecified   


Plan to address problem: 


Continue medical management, supportive care.








(5) Advance care planning


Current Visit: Yes   Status: Acute   


Plan to address problem: 


Disease education done, care plan discussed, diagnoses discussed, prognosis 

discussed, patient is full code.  Patient acknowledges understanding and 

agreement with care plan, +30 minutes.








(6) Preventative health care


Current Visit: Yes   Status: Acute   


Plan to address problem: 


Patient counseled regarding balanced diet, increase physical activity at 

discharge, consistent carbohydrate diet, weight reduction, patient instructed to

follow-up with primary care physician regarding all age and risk factor 

appropriate screening test.  +30 minutes.








History


Interval history: 


60 YO Female with Obesity Hypoventilation Syndrome, HTN, DM, Bipolar Disorder, 

Schizophrenia, Schizo-Affective Disorder. Consult placed by Dr. Long for 

medical management.  Patient seen and evaluated in the recreation room.  Patient

appears to be at baseline level of cognition and function.














Hospitalist Physical





- Constitutional


Vitals: 


                                        











Temp Pulse Resp BP Pulse Ox


 


 98.8 F   83   18   112/76   96 


 


 07/16/22 21:00  07/16/22 21:00  07/16/22 21:00  07/16/22 21:00  07/16/22 21:00











General appearance: Present: no acute distress, obese





- EENT


Eyes: Present: PERRL


ENT: hearing intact





- Neck


Neck: Present: supple





- Respiratory


Respiratory effort: normal


Respiratory: bilateral: CTA





- Cardiovascular


Rhythm: regular


Heart Sounds: Present: S1 & S2





- Extremities


Extremities: no ischemia


Peripheral Pulses: within normal limits





- Abdominal


General gastrointestinal: soft, non-tender, non-distended





- Integumentary


Integumentary: Present: clear, dry





- Psychiatric


Psychiatric: cooperative





- Neurologic


Neurologic: CNII-XII intact





Results





- Labs


CBC & Chem 7: 


                                 07/08/22 06:16





                                 07/08/22 06:16


Labs: 


                             Laboratory Last Values











WBC  4.1 K/mm3 (4.5-11.0)  L  07/08/22  06:16    


 


RBC  4.51 M/mm3 (3.65-5.03)   07/08/22  06:16    


 


Hgb  13.4 gm/dl (10.1-14.3)   07/08/22  06:16    


 


Hct  40.9 % (30.3-42.9)   07/08/22  06:16    


 


MCV  91 fl (79-97)   07/08/22  06:16    


 


MCH  30 pg (28-32)   07/08/22  06:16    


 


MCHC  33 % (30-34)   07/08/22  06:16    


 


RDW  13.3 % (13.2-15.2)   07/08/22  06:16    


 


Plt Count  172 K/mm3 (140-440)   07/08/22  06:16    


 


Lymph % (Auto)  35.4 % (13.4-35.0)  H  07/08/22  06:16    


 


Mono % (Auto)  8.9 % (0.0-7.3)  H  07/08/22  06:16    


 


Eos % (Auto)  2.2 % (0.0-4.3)   07/08/22  06:16    


 


Baso % (Auto)  1.2 % (0.0-1.8)   07/08/22  06:16    


 


Lymph # (Auto)  1.4 K/mm3 (1.2-5.4)   07/08/22  06:16    


 


Mono # (Auto)  0.4 K/mm3 (0.0-0.8)   07/08/22  06:16    


 


Eos # (Auto)  0.1 K/mm3 (0.0-0.4)   07/08/22  06:16    


 


Baso # (Auto)  0.0 K/mm3 (0.0-0.1)   07/08/22  06:16    


 


Seg Neutrophils %  52.3 % (40.0-70.0)   07/08/22  06:16    


 


Seg Neutrophils #  2.1 K/mm3 (1.8-7.7)   07/08/22  06:16    


 


Sodium  138 mmol/L (137-145)   07/08/22  06:16    


 


Potassium  3.8 mmol/L (3.6-5.0)   07/08/22  06:16    


 


Chloride  103.7 mmol/L ()   07/08/22  06:16    


 


Carbon Dioxide  24 mmol/L (22-30)   07/08/22  06:16    


 


Anion Gap  14 mmol/L  07/08/22  06:16    


 


BUN  11 mg/dL (7-17)   07/08/22  06:16    


 


Creatinine  0.6 mg/dL (0.6-1.2)   07/08/22  06:16    


 


Estimated GFR  > 60 ml/min  07/08/22  06:16    


 


BUN/Creatinine Ratio  18 %  07/08/22  06:16    


 


Glucose  206 mg/dL ()  H  07/08/22  06:16    


 


POC Glucose  196 mg/dL ()  H  07/14/22  19:56    


 


Hemoglobin A1c  9.1 % (4-6)  H  07/08/22  06:16    


 


Calcium  8.9 mg/dL (8.4-10.2)   07/08/22  06:16    


 


Total Bilirubin  0.60 mg/dL (0.1-1.2)   07/08/22  06:16    


 


AST  20 units/L (5-40)   07/08/22  06:16    


 


ALT  27 units/L (7-56)   07/08/22  06:16    


 


Alkaline Phosphatase  47 units/L ()   07/08/22  06:16    


 


Total Protein  6.4 g/dL (6.3-8.2)   07/08/22  06:16    


 


Albumin  4.3 g/dL (3.9-5)   07/08/22  06:16    


 


Albumin/Globulin Ratio  2.0 %  07/08/22  06:16    


 


Triglycerides  142 mg/dL (2-149)   07/08/22  06:16    


 


Cholesterol  144 mg/dL ()   07/08/22  06:16    


 


LDL Cholesterol Direct  96 mg/dL ()   07/08/22  06:16    


 


HDL Cholesterol  37 mg/dL (40-59)  L  07/08/22  06:16    


 


Cholesterol/HDL Ratio  3.89 %  07/08/22  06:16    


 


TSH  0.672 mlU/mL (0.270-4.200)   07/08/22  06:16    











Choe/IV: 


                                        





Voiding Method                   Toilet











Active Medications





- Current Medications


Current Medications: 














Generic Name Dose Route Start Last Admin





  Trade Name Freq  PRN Reason Stop Dose Admin


 


Bupropion HCl  150 mg  07/13/22 10:00  07/18/22 09:00





  Bupropion Xl 150 Mg Tab  PO   150 mg





  QDAY YESSICA   Administration


 


Divalproex Sodium  1,000 mg  07/13/22 10:00  07/18/22 09:00





  Divalproex Dr 500 Mg Tab  PO   1,000 mg





  BID YESSICA   Administration


 


Fluoxetine HCl  20 mg  07/09/22 10:00  07/18/22 09:00





  Fluoxetine 20 Mg Cap  PO   20 mg





  QDAY YESSICA   Administration


 


Insulin Human Lispro  0 unit  07/08/22 07:30  07/18/22 12:02





  Insulin Lispro 100 Unit/Ml  SUB-Q   Not Given





  [facility]  





  Protocol  


 


Lorazepam  2 mg  07/08/22 19:59  07/17/22 17:21





  Lorazepam 2 Mg/Ml Vial  IM   2 mg





  Q6H PRN   Administration





  Agitation  


 


Melatonin  10 mg  07/18/22 22:00 





  Melatonin 5 Mg Tab  PO  





  QHS YESSICA  


 


Metformin HCl  500 mg  07/09/22 08:00  07/18/22 08:57





  Metformin 500 Mg Tab  PO   500 mg





  BIDDIAB YESSICA   Administration


 


Olanzapine  2.5 mg  07/16/22 11:00  07/18/22 09:00





  Olanzapine 2.5 Mg Tab  PO   2.5 mg





  QDAY YESSICA   Administration


 


Propranolol HCl  10 mg  07/08/22 22:00  07/18/22 09:00





  Propranolol 10 Mg Tab  PO   Not Given





  Q12HR YESSICA  


 


Risperidone  2 mg  07/08/22 22:00  07/18/22 09:00





  Risperidone 1 Mg Tab  PO   2 mg





  BID YESSICA   Administration


 


Trazodone HCl  150 mg  07/16/22 22:00  07/18/22 06:15





  Trazodone 50 Mg Tab  PO   Not Given





  [facility]  














Nutrition/Malnutrition Assess





- Dietary Evaluation


Nutrition/Malnutrition Findings: 


                                        





Nutrition Notes                                            Start:  07/15/22 

19:14


Freq:                                                      Status: Active       




Protocol:                                                                       




 Document     07/15/22 19:14  ZAHRAA  (Rec: 07/15/22 19:22  ZAHRAA  [id number])


 Nutrition Notes


     Need for Assessment generated from:         LOS


     Initial or Follow up                        Assessment


     Other Pertinent Diagnosis                   Bipolar Disorder,


                                                 Schizoaffective Disorder.


     Current Diet                                Cardiac/Consistent


                                                 Carbohydrates Diet (since B 07 /07).


     Labs/Tests                                  07/15: N/A.


     Pertinent Medications                       07/15: Nutritionally


                                                 unremarkable.


     Height                                      5 ft 7 in


     Weight                                      99.7 kg


     Ideal Body Weight (kg)                      61.36


     BMI                                         34.4


     Intake Prior to Admission                   Good


     Weight change and time frame                Pt denies having loss body


                                                 weight PTA.


     Weight Status                               Obese


     Subjective/Other Information                RD consult for LOS assessment.


                                                 Pt's PO intake of meals has


                                                 been Good, (100%) and well


                                                 tolerated, according to ADL


                                                 notes.


                                                 Pt is on Room Air, O2


                                                 saturation @ 100%, according


                                                 to Vital Signs notes.


                                                 Pt presents unspecified


                                                 bruises as signs of concern


                                                 for skin risk at this time,


                                                 accoreding to Physical


                                                 Assessment History notes.


     Percent of energy/protein needs met:        Prescribed Cardiac/Consistent


                                                 Carbohydrates Diet provides


                                                 for energy/protein needs (1,


                                                 977 Kcal/86 g) during LOS.


     Burn                                        Absent


     Trauma                                      Absent


     GI Symptoms                                 None


     Food Allergy                                No


     Skin Integrity/Comment                      Unspecified bruises.


     Current % PO                                Good (%)


     Minimum of two criteria                     No


     Fluid Accumulation                          N/A


     Reduced  Strength                       N/A (non-severe)


     Protein-Calorie Malnutrition                N\A


     #1


      Nutrition Diagnosis                        No nutrition diagnosis at this


                                                 time


     Is patient on ventilator?                   No


     Is Patient Ambulatory and/or Out of Bed     Yes


     REE-([facility]-ambulatory/OOB) [     [id number]


      [email or web address]]                               


     Kcal/Kg value to use for calculation        18


     Approximate Energy Requirements Using       1795


      kcal/Kg                                    


     Calculation Used for Recommendations        Kcal/kg


     Additional Notes                            Protein: 0.8-1 g/Kg AdjBW; 65-


                                                 81 g/day.


                                                 Fluids: 1 ml/Kcal, or as per


                                                 MD.


 Nutrition Intervention


     Change Diet Order:                          Continue Cardiac/Consistent


                                                 Carbohydrates Diet.


     Revisit per MD consult or patient           Sign Off


      request:                                   


     Additional Comments                         Continue monitoring food


                                                 tolerance, %PO intake of meals


                                                 , and BM.

## 2022-07-18 NOTE — PROGRESS NOTE
Assessment and Plan





- Patient Problems


(1) HTN (hypertension)


Current Visit: Yes   Status: Acute   


Qualifiers: 


   Hypertension type: primary hypertension   Qualified Code(s): I10 - Essential 

(primary) hypertension   


Plan to address problem: 


Monitor blood pressure every shift, continue medical management.








(2) Diabetes


Current Visit: Yes   Status: Acute   


Plan to address problem: 


Consistent carbohydrate diet, Accu-Chek, insulin protocol, hypoglycemia 

protocol.








(3) Bipolar 1 disorder


Current Visit: Yes   Status: Acute   


Plan to address problem: 


Continue medical management, supportive care, behavior change counseling,








(4) Schizophrenia


Current Visit: Yes   Status: Acute   


Qualifiers: 


   Schizophrenia type: unspecified   Qualified Code(s): F20.9 - Schizophrenia, 

unspecified   


Plan to address problem: 


Continue medical management, supportive care.








(5) Advance care planning


Current Visit: Yes   Status: Acute   


Plan to address problem: 


Disease education done, care plan discussed, diagnoses discussed, prognosis 

discussed, patient is full code.  Patient acknowledges understanding and 

agreement with care plan, +30 minutes.








(6) Preventative health care


Current Visit: Yes   Status: Acute   


Plan to address problem: 


Patient counseled regarding balanced diet, increase physical activity at 

discharge, consistent carbohydrate diet, weight reduction, patient instructed to

follow-up with primary care physician regarding all age and risk factor 

appropriate screening test.  +30 minutes.








History


Interval history: 


60 YO Female with Obesity Hypoventilation Syndrome, HTN, DM, Bipolar Disorder, 

Schizophrenia, Schizo-Affective Disorder. Consult placed by Dr. Long for 

medical management.  Patient seen and evaluated in the recreation room.  Patient

appears to be at baseline level of cognition and function.














Hospitalist Physical





- Constitutional


Vitals: 


                                        











Temp Pulse Resp BP Pulse Ox


 


 98.8 F   83   18   112/76   96 


 


 07/16/22 21:00  07/16/22 21:00  07/16/22 21:00  07/16/22 21:00  07/16/22 21:00











General appearance: Present: no acute distress, obese





- EENT


Eyes: Present: PERRL


ENT: hearing intact





- Neck


Neck: Present: supple





- Respiratory


Respiratory effort: normal


Respiratory: bilateral: CTA





- Cardiovascular


Rhythm: regular


Heart Sounds: Present: S1 & S2





- Extremities


Extremities: no ischemia


Peripheral Pulses: within normal limits





- Abdominal


General gastrointestinal: soft, non-tender, non-distended





- Integumentary


Integumentary: Present: clear, dry





- Psychiatric


Psychiatric: cooperative





- Neurologic


Neurologic: CNII-XII intact





Results





- Labs


CBC & Chem 7: 


                                 07/08/22 06:16





                                 07/08/22 06:16


Labs: 


                             Laboratory Last Values











WBC  4.1 K/mm3 (4.5-11.0)  L  07/08/22  06:16    


 


RBC  4.51 M/mm3 (3.65-5.03)   07/08/22  06:16    


 


Hgb  13.4 gm/dl (10.1-14.3)   07/08/22  06:16    


 


Hct  40.9 % (30.3-42.9)   07/08/22  06:16    


 


MCV  91 fl (79-97)   07/08/22  06:16    


 


MCH  30 pg (28-32)   07/08/22  06:16    


 


MCHC  33 % (30-34)   07/08/22  06:16    


 


RDW  13.3 % (13.2-15.2)   07/08/22  06:16    


 


Plt Count  172 K/mm3 (140-440)   07/08/22  06:16    


 


Lymph % (Auto)  35.4 % (13.4-35.0)  H  07/08/22  06:16    


 


Mono % (Auto)  8.9 % (0.0-7.3)  H  07/08/22  06:16    


 


Eos % (Auto)  2.2 % (0.0-4.3)   07/08/22  06:16    


 


Baso % (Auto)  1.2 % (0.0-1.8)   07/08/22  06:16    


 


Lymph # (Auto)  1.4 K/mm3 (1.2-5.4)   07/08/22  06:16    


 


Mono # (Auto)  0.4 K/mm3 (0.0-0.8)   07/08/22  06:16    


 


Eos # (Auto)  0.1 K/mm3 (0.0-0.4)   07/08/22  06:16    


 


Baso # (Auto)  0.0 K/mm3 (0.0-0.1)   07/08/22  06:16    


 


Seg Neutrophils %  52.3 % (40.0-70.0)   07/08/22  06:16    


 


Seg Neutrophils #  2.1 K/mm3 (1.8-7.7)   07/08/22  06:16    


 


Sodium  138 mmol/L (137-145)   07/08/22  06:16    


 


Potassium  3.8 mmol/L (3.6-5.0)   07/08/22  06:16    


 


Chloride  103.7 mmol/L ()   07/08/22  06:16    


 


Carbon Dioxide  24 mmol/L (22-30)   07/08/22  06:16    


 


Anion Gap  14 mmol/L  07/08/22  06:16    


 


BUN  11 mg/dL (7-17)   07/08/22  06:16    


 


Creatinine  0.6 mg/dL (0.6-1.2)   07/08/22  06:16    


 


Estimated GFR  > 60 ml/min  07/08/22  06:16    


 


BUN/Creatinine Ratio  18 %  07/08/22  06:16    


 


Glucose  206 mg/dL ()  H  07/08/22  06:16    


 


POC Glucose  196 mg/dL ()  H  07/14/22  19:56    


 


Hemoglobin A1c  9.1 % (4-6)  H  07/08/22  06:16    


 


Calcium  8.9 mg/dL (8.4-10.2)   07/08/22  06:16    


 


Total Bilirubin  0.60 mg/dL (0.1-1.2)   07/08/22  06:16    


 


AST  20 units/L (5-40)   07/08/22  06:16    


 


ALT  27 units/L (7-56)   07/08/22  06:16    


 


Alkaline Phosphatase  47 units/L ()   07/08/22  06:16    


 


Total Protein  6.4 g/dL (6.3-8.2)   07/08/22  06:16    


 


Albumin  4.3 g/dL (3.9-5)   07/08/22  06:16    


 


Albumin/Globulin Ratio  2.0 %  07/08/22  06:16    


 


Triglycerides  142 mg/dL (2-149)   07/08/22  06:16    


 


Cholesterol  144 mg/dL ()   07/08/22  06:16    


 


LDL Cholesterol Direct  96 mg/dL ()   07/08/22  06:16    


 


HDL Cholesterol  37 mg/dL (40-59)  L  07/08/22  06:16    


 


Cholesterol/HDL Ratio  3.89 %  07/08/22  06:16    


 


TSH  0.672 mlU/mL (0.270-4.200)   07/08/22  06:16    











Choe/IV: 


                                        





Voiding Method                   Toilet











Active Medications





- Current Medications


Current Medications: 














Generic Name Dose Route Start Last Admin





  Trade Name Freq  PRN Reason Stop Dose Admin


 


Bupropion HCl  150 mg  07/13/22 10:00  07/18/22 09:00





  Bupropion Xl 150 Mg Tab  PO   150 mg





  QDAY YESSICA   Administration


 


Divalproex Sodium  1,000 mg  07/13/22 10:00  07/18/22 09:00





  Divalproex Dr 500 Mg Tab  PO   1,000 mg





  BID YESSICA   Administration


 


Fluoxetine HCl  20 mg  07/09/22 10:00  07/18/22 09:00





  Fluoxetine 20 Mg Cap  PO   20 mg





  QDAY YESSICA   Administration


 


Insulin Human Lispro  0 unit  07/08/22 07:30  07/18/22 12:02





  Insulin Lispro 100 Unit/Ml  SUB-Q   Not Given





  [facility]  





  Protocol  


 


Lorazepam  2 mg  07/08/22 19:59  07/17/22 17:21





  Lorazepam 2 Mg/Ml Vial  IM   2 mg





  Q6H PRN   Administration





  Agitation  


 


Melatonin  10 mg  07/18/22 22:00 





  Melatonin 5 Mg Tab  PO  





  [facility] YESSICA  


 


Metformin HCl  500 mg  07/09/22 08:00  07/18/22 08:57





  Metformin 500 Mg Tab  PO   500 mg





  BIDDIAB YESSICA   Administration


 


Olanzapine  2.5 mg  07/16/22 11:00  07/18/22 09:00





  Olanzapine 2.5 Mg Tab  PO   2.5 mg





  QDAY YESSICA   Administration


 


Propranolol HCl  10 mg  07/08/22 22:00  07/18/22 09:00





  Propranolol 10 Mg Tab  PO   Not Given





  Q12HR YESSICA  


 


Risperidone  2 mg  07/08/22 22:00  07/18/22 09:00





  Risperidone 1 Mg Tab  PO   2 mg





  BID YESSICA   Administration


 


Trazodone HCl  150 mg  07/16/22 22:00  07/18/22 06:15





  Trazodone 50 Mg Tab  PO   Not Given





  [facility]  














Nutrition/Malnutrition Assess





- Dietary Evaluation


Nutrition/Malnutrition Findings: 


                                        





Nutrition Notes                                            Start:  07/15/22 

19:14


Freq:                                                      Status: Active       




Protocol:                                                                       




 Document     07/15/22 19:14  ZAHRAA  (Rec: 07/15/22 19:22  ZAHRAA  [id number])


 Nutrition Notes


     Need for Assessment generated from:         LOS


     Initial or Follow up                        Assessment


     Other Pertinent Diagnosis                   Bipolar Disorder,


                                                 Schizoaffective Disorder.


     Current Diet                                Cardiac/Consistent


                                                 Carbohydrates Diet (since B 07 /07).


     Labs/Tests                                  07/15: N/A.


     Pertinent Medications                       07/15: Nutritionally


                                                 unremarkable.


     Height                                      5 ft 7 in


     Weight                                      99.7 kg


     Ideal Body Weight (kg)                      61.36


     BMI                                         34.4


     Intake Prior to Admission                   Good


     Weight change and time frame                Pt denies having loss body


                                                 weight PTA.


     Weight Status                               Obese


     Subjective/Other Information                RD consult for LOS assessment.


                                                 Pt's PO intake of meals has


                                                 been Good, (100%) and well


                                                 tolerated, according to ADL


                                                 notes.


                                                 Pt is on Room Air, O2


                                                 saturation @ 100%, according


                                                 to Vital Signs notes.


                                                 Pt presents unspecified


                                                 bruises as signs of concern


                                                 for skin risk at this time,


                                                 accoreding to Physical


                                                 Assessment History notes.


     Percent of energy/protein needs met:        Prescribed Cardiac/Consistent


                                                 Carbohydrates Diet provides


                                                 for energy/protein needs (1,


                                                 977 Kcal/86 g) during LOS.


     Burn                                        Absent


     Trauma                                      Absent


     GI Symptoms                                 None


     Food Allergy                                No


     Skin Integrity/Comment                      Unspecified bruises.


     Current % PO                                Good (%)


     Minimum of two criteria                     No


     Fluid Accumulation                          N/A


     Reduced  Strength                       N/A (non-severe)


     Protein-Calorie Malnutrition                N\A


     #1


      Nutrition Diagnosis                        No nutrition diagnosis at this


                                                 time


     Is patient on ventilator?                   No


     Is Patient Ambulatory and/or Out of Bed     Yes


     REE-([facility]-ambulatory/OOB) [     2086.019


      [email or web address]]                               


     Kcal/Kg value to use for calculation        18


     Approximate Energy Requirements Using       1795


      kcal/Kg                                    


     Calculation Used for Recommendations        Kcal/kg


     Additional Notes                            Protein: 0.8-1 g/Kg AdjBW; 65-


                                                 81 g/day.


                                                 Fluids: 1 ml/Kcal, or as per


                                                 MD.


 Nutrition Intervention


     Change Diet Order:                          Continue Cardiac/Consistent


                                                 Carbohydrates Diet.


     Revisit per MD consult or patient           Sign Off


      request:                                   


     Additional Comments                         Continue monitoring food


                                                 tolerance, %PO intake of meals


                                                 , and BM.

## 2022-07-18 NOTE — PROGRESS NOTE
Assessment and Plan





- Patient Problems


(1) HTN (hypertension)


Current Visit: Yes   Status: Acute   


Qualifiers: 


   Hypertension type: primary hypertension   Qualified Code(s): I10 - Essential 

(primary) hypertension   


Plan to address problem: 


Monitor blood pressure every shift, continue medical management.








(2) Diabetes


Current Visit: Yes   Status: Acute   


Plan to address problem: 


Consistent carbohydrate diet, Accu-Chek, insulin protocol, hypoglycemia 

protocol.








(3) Bipolar 1 disorder


Current Visit: Yes   Status: Acute   


Plan to address problem: 


Continue medical management, supportive care, behavior change counseling,








(4) Schizophrenia


Current Visit: Yes   Status: Acute   


Qualifiers: 


   Schizophrenia type: unspecified   Qualified Code(s): F20.9 - Schizophrenia, 

unspecified   


Plan to address problem: 


Continue medical management, supportive care.








(5) Advance care planning


Current Visit: Yes   Status: Acute   


Plan to address problem: 


Disease education done, care plan discussed, diagnoses discussed, prognosis 

discussed, patient is full code.  Patient acknowledges understanding and 

agreement with care plan, +30 minutes.








(6) Preventative health care


Current Visit: Yes   Status: Acute   


Plan to address problem: 


Patient counseled regarding balanced diet, increase physical activity at 

discharge, consistent carbohydrate diet, weight reduction, patient instructed to

follow-up with primary care physician regarding all age and risk factor 

appropriate screening test.  +30 minutes.








History


Interval history: 


58 YO Female with Obesity Hypoventilation Syndrome, HTN, DM, Bipolar Disorder, 

Schizophrenia, Schizo-Affective Disorder. Consult placed by Dr. Long for 

medical management.  Patient seen and evaluated in the recreation room.  Patient

appears to be at baseline level of cognition and function.














Hospitalist Physical





- Constitutional


Vitals: 


                                        











Temp Pulse Resp BP Pulse Ox


 


 98.8 F   83   18   112/76   96 


 


 07/16/22 21:00  07/16/22 21:00  07/16/22 21:00  07/16/22 21:00  07/16/22 21:00











General appearance: Present: no acute distress, obese





- EENT


Eyes: Present: PERRL


ENT: hearing intact





- Neck


Neck: Present: supple





- Respiratory


Respiratory effort: normal


Respiratory: bilateral: diminished





- Cardiovascular


Rhythm: regular


Heart Sounds: Present: S1 & S2





- Extremities


Extremities: no ischemia


Peripheral Pulses: within normal limits





- Abdominal


General gastrointestinal: soft, non-tender, non-distended





- Integumentary


Integumentary: Present: clear, dry





- Psychiatric


Psychiatric: cooperative





- Neurologic


Neurologic: CNII-XII intact





Results





- Labs


CBC & Chem 7: 


                                 07/08/22 06:16





                                 07/08/22 06:16


Labs: 


                             Laboratory Last Values











WBC  4.1 K/mm3 (4.5-11.0)  L  07/08/22  06:16    


 


RBC  4.51 M/mm3 (3.65-5.03)   07/08/22  06:16    


 


Hgb  13.4 gm/dl (10.1-14.3)   07/08/22  06:16    


 


Hct  40.9 % (30.3-42.9)   07/08/22  06:16    


 


MCV  91 fl (79-97)   07/08/22  06:16    


 


MCH  30 pg (28-32)   07/08/22  06:16    


 


MCHC  33 % (30-34)   07/08/22  06:16    


 


RDW  13.3 % (13.2-15.2)   07/08/22  06:16    


 


Plt Count  172 K/mm3 (140-440)   07/08/22  06:16    


 


Lymph % (Auto)  35.4 % (13.4-35.0)  H  07/08/22  06:16    


 


Mono % (Auto)  8.9 % (0.0-7.3)  H  07/08/22  06:16    


 


Eos % (Auto)  2.2 % (0.0-4.3)   07/08/22  06:16    


 


Baso % (Auto)  1.2 % (0.0-1.8)   07/08/22  06:16    


 


Lymph # (Auto)  1.4 K/mm3 (1.2-5.4)   07/08/22  06:16    


 


Mono # (Auto)  0.4 K/mm3 (0.0-0.8)   07/08/22  06:16    


 


Eos # (Auto)  0.1 K/mm3 (0.0-0.4)   07/08/22  06:16    


 


Baso # (Auto)  0.0 K/mm3 (0.0-0.1)   07/08/22  06:16    


 


Seg Neutrophils %  52.3 % (40.0-70.0)   07/08/22  06:16    


 


Seg Neutrophils #  2.1 K/mm3 (1.8-7.7)   07/08/22  06:16    


 


Sodium  138 mmol/L (137-145)   07/08/22  06:16    


 


Potassium  3.8 mmol/L (3.6-5.0)   07/08/22  06:16    


 


Chloride  103.7 mmol/L ()   07/08/22  06:16    


 


Carbon Dioxide  24 mmol/L (22-30)   07/08/22  06:16    


 


Anion Gap  14 mmol/L  07/08/22  06:16    


 


BUN  11 mg/dL (7-17)   07/08/22  06:16    


 


Creatinine  0.6 mg/dL (0.6-1.2)   07/08/22  06:16    


 


Estimated GFR  > 60 ml/min  07/08/22  06:16    


 


BUN/Creatinine Ratio  18 %  07/08/22  06:16    


 


Glucose  206 mg/dL ()  H  07/08/22  06:16    


 


POC Glucose  196 mg/dL ()  H  07/14/22  19:56    


 


Hemoglobin A1c  9.1 % (4-6)  H  07/08/22  06:16    


 


Calcium  8.9 mg/dL (8.4-10.2)   07/08/22  06:16    


 


Total Bilirubin  0.60 mg/dL (0.1-1.2)   07/08/22  06:16    


 


AST  20 units/L (5-40)   07/08/22  06:16    


 


ALT  27 units/L (7-56)   07/08/22  06:16    


 


Alkaline Phosphatase  47 units/L ()   07/08/22  06:16    


 


Total Protein  6.4 g/dL (6.3-8.2)   07/08/22  06:16    


 


Albumin  4.3 g/dL (3.9-5)   07/08/22  06:16    


 


Albumin/Globulin Ratio  2.0 %  07/08/22  06:16    


 


Triglycerides  142 mg/dL (2-149)   07/08/22  06:16    


 


Cholesterol  144 mg/dL ()   07/08/22  06:16    


 


LDL Cholesterol Direct  96 mg/dL ()   07/08/22  06:16    


 


HDL Cholesterol  37 mg/dL (40-59)  L  07/08/22  06:16    


 


Cholesterol/HDL Ratio  3.89 %  07/08/22  06:16    


 


TSH  0.672 mlU/mL (0.270-4.200)   07/08/22  06:16    











Choe/IV: 


                                        





Voiding Method                   Toilet











Active Medications





- Current Medications


Current Medications: 














Generic Name Dose Route Start Last Admin





  Trade Name Freq  PRN Reason Stop Dose Admin


 


Bupropion HCl  150 mg  07/13/22 10:00  07/18/22 09:00





  Bupropion Xl 150 Mg Tab  PO   150 mg





  QDAY YESSICA   Administration


 


Divalproex Sodium  1,000 mg  07/13/22 10:00  07/18/22 09:00





  Divalproex Dr 500 Mg Tab  PO   1,000 mg





  BID YESSICA   Administration


 


Fluoxetine HCl  20 mg  07/09/22 10:00  07/18/22 09:00





  Fluoxetine 20 Mg Cap  PO   20 mg





  QDAY YESSICA   Administration


 


Insulin Human Lispro  0 unit  07/08/22 07:30  07/18/22 12:02





  Insulin Lispro 100 Unit/Ml  SUB-Q   Not Given





  [facility]  





  Protocol  


 


Lorazepam  2 mg  07/08/22 19:59  07/17/22 17:21





  Lorazepam 2 Mg/Ml Vial  IM   2 mg





  Q6H PRN   Administration





  Agitation  


 


Melatonin  10 mg  07/18/22 22:00 





  Melatonin 5 Mg Tab  PO  





  [facility] YESSICA  


 


Metformin HCl  500 mg  07/09/22 08:00  07/18/22 08:57





  Metformin 500 Mg Tab  PO   500 mg





  BIDDIAB YESSICA   Administration


 


Olanzapine  2.5 mg  07/16/22 11:00  07/18/22 09:00





  Olanzapine 2.5 Mg Tab  PO   2.5 mg





  QDAY YESSICA   Administration


 


Propranolol HCl  10 mg  07/08/22 22:00  07/18/22 09:00





  Propranolol 10 Mg Tab  PO   Not Given





  Q12HR YESSICA  


 


Risperidone  2 mg  07/08/22 22:00  07/18/22 09:00





  Risperidone 1 Mg Tab  PO   2 mg





  BID YESSICA   Administration


 


Trazodone HCl  150 mg  07/16/22 22:00  07/18/22 06:15





  Trazodone 50 Mg Tab  PO   Not Given





  [facility]  














Nutrition/Malnutrition Assess





- Dietary Evaluation


Nutrition/Malnutrition Findings: 


                                        





Nutrition Notes                                            Start:  07/15/22 

19:14


Freq:                                                      Status: Active       




Protocol:                                                                       




 Document     07/15/22 19:14  ZAHRAA  (Rec: 07/15/22 19:22  ZAHRAA  [id number])


 Nutrition Notes


     Need for Assessment generated from:         LOS


     Initial or Follow up                        Assessment


     Other Pertinent Diagnosis                   Bipolar Disorder,


                                                 Schizoaffective Disorder.


     Current Diet                                Cardiac/Consistent


                                                 Carbohydrates Diet (since B 07 /07).


     Labs/Tests                                  07/15: N/A.


     Pertinent Medications                       07/15: Nutritionally


                                                 unremarkable.


     Height                                      5 ft 7 in


     Weight                                      99.7 kg


     Ideal Body Weight (kg)                      61.36


     BMI                                         34.4


     Intake Prior to Admission                   Good


     Weight change and time frame                Pt denies having loss body


                                                 weight PTA.


     Weight Status                               Obese


     Subjective/Other Information                RD consult for LOS assessment.


                                                 Pt's PO intake of meals has


                                                 been Good, (100%) and well


                                                 tolerated, according to ADL


                                                 notes.


                                                 Pt is on Room Air, O2


                                                 saturation @ 100%, according


                                                 to Vital Signs notes.


                                                 Pt presents unspecified


                                                 bruises as signs of concern


                                                 for skin risk at this time,


                                                 accoreding to Physical


                                                 Assessment History notes.


     Percent of energy/protein needs met:        Prescribed Cardiac/Consistent


                                                 Carbohydrates Diet provides


                                                 for energy/protein needs (1,


                                                 977 Kcal/86 g) during LOS.


     Burn                                        Absent


     Trauma                                      Absent


     GI Symptoms                                 None


     Food Allergy                                No


     Skin Integrity/Comment                      Unspecified bruises.


     Current % PO                                Good (%)


     Minimum of two criteria                     No


     Fluid Accumulation                          N/A


     Reduced  Strength                       N/A (non-severe)


     Protein-Calorie Malnutrition                N\A


     #1


      Nutrition Diagnosis                        No nutrition diagnosis at this


                                                 time


     Is patient on ventilator?                   No


     Is Patient Ambulatory and/or Out of Bed     Yes


     REE-([facility]-ambulatory/OOB) [     2086.019


      [email or web address]]                               


     Kcal/Kg value to use for calculation        18


     Approximate Energy Requirements Using       1795


      kcal/Kg                                    


     Calculation Used for Recommendations        Kcal/kg


     Additional Notes                            Protein: 0.8-1 g/Kg AdjBW; 65-


                                                 81 g/day.


                                                 Fluids: 1 ml/Kcal, or as per


                                                 MD.


 Nutrition Intervention


     Change Diet Order:                          Continue Cardiac/Consistent


                                                 Carbohydrates Diet.


     Revisit per MD consult or patient           Sign Off


      request:                                   


     Additional Comments                         Continue monitoring food


                                                 tolerance, %PO intake of meals


                                                 , and BM.

## 2022-07-19 NOTE — DISCHARGE SUMMARY
Providers





- Providers


Date of Admission: 


07/08/22 01:09





Date of discharge: 07/19/22


Attending physician: 


SOSA QUINTERO MD





                                        





07/07/22 23:44


Consult to Physician [CONS] Routine 


   Comment: 


   Consulting Provider: BRAYDEN MENDOZA


   Physician Instructions: 


   Reason For Exam: H & P











Primary care physician: 


PRIMARY CARE MD








Hospitalization


Reason for admission: hallucinations, agitation


Admitting Diagnosis: F25.9 - SCHIZOAFFECTIVE DISORDER, UNSPECIFIED


Condition: Stable


Hospital course: 


The patient was provided inpatient psychiatric treatment with safe and 

supportive care, medication adjustment, adverse effect monitoring, medical 

evaluations, medical treatments, assessment and psycho-education. The patient's 

mood, cognition, behavior, moral support are improved and stabilized. St the 

time of discharge, the patient had no endangering behavior and no debilitating 

adverse effects. The patient agreed on potential consequences of no treatment 

and gave informed consent. 





Disposition: 01 HOME / SELF CARE / HOMELESS


Time spent for discharge: 35


Allergies/Adverse Reactions: 


                                    Allergies





No Known Allergies Allergy (Verified 07/06/22 11:13)


   








Vital Signs: 


                                Last Vital Signs











Temp  98.8 F   07/16/22 21:00


 


Pulse  83   07/16/22 21:00


 


Resp  18   07/16/22 21:00


 


BP  112/76   07/16/22 21:00


 


Pulse Ox  96   07/16/22 21:00











Last Lab: 


                             Laboratory Last Values











WBC  4.1 K/mm3 (4.5-11.0)  L  07/08/22  06:16    


 


RBC  4.51 M/mm3 (3.65-5.03)   07/08/22  06:16    


 


Hgb  13.4 gm/dl (10.1-14.3)   07/08/22  06:16    


 


Hct  40.9 % (30.3-42.9)   07/08/22  06:16    


 


MCV  91 fl (79-97)   07/08/22  06:16    


 


MCH  30 pg (28-32)   07/08/22  06:16    


 


MCHC  33 % (30-34)   07/08/22  06:16    


 


RDW  13.3 % (13.2-15.2)   07/08/22  06:16    


 


Plt Count  172 K/mm3 (140-440)   07/08/22  06:16    


 


Lymph % (Auto)  35.4 % (13.4-35.0)  H  07/08/22  06:16    


 


Mono % (Auto)  8.9 % (0.0-7.3)  H  07/08/22  06:16    


 


Eos % (Auto)  2.2 % (0.0-4.3)   07/08/22  06:16    


 


Baso % (Auto)  1.2 % (0.0-1.8)   07/08/22  06:16    


 


Lymph # (Auto)  1.4 K/mm3 (1.2-5.4)   07/08/22  06:16    


 


Mono # (Auto)  0.4 K/mm3 (0.0-0.8)   07/08/22  06:16    


 


Eos # (Auto)  0.1 K/mm3 (0.0-0.4)   07/08/22  06:16    


 


Baso # (Auto)  0.0 K/mm3 (0.0-0.1)   07/08/22  06:16    


 


Seg Neutrophils %  52.3 % (40.0-70.0)   07/08/22  06:16    


 


Seg Neutrophils #  2.1 K/mm3 (1.8-7.7)   07/08/22  06:16    


 


Sodium  138 mmol/L (137-145)   07/08/22  06:16    


 


Potassium  3.8 mmol/L (3.6-5.0)   07/08/22  06:16    


 


Chloride  103.7 mmol/L ()   07/08/22  06:16    


 


Carbon Dioxide  24 mmol/L (22-30)   07/08/22  06:16    


 


Anion Gap  14 mmol/L  07/08/22  06:16    


 


BUN  11 mg/dL (7-17)   07/08/22  06:16    


 


Creatinine  0.6 mg/dL (0.6-1.2)   07/08/22  06:16    


 


Estimated GFR  > 60 ml/min  07/08/22  06:16    


 


BUN/Creatinine Ratio  18 %  07/08/22  06:16    


 


Glucose  206 mg/dL ()  H  07/08/22  06:16    


 


POC Glucose  160 mg/dL ()  H  07/18/22  20:31    


 


Hemoglobin A1c  9.1 % (4-6)  H  07/08/22  06:16    


 


Calcium  8.9 mg/dL (8.4-10.2)   07/08/22  06:16    


 


Total Bilirubin  0.60 mg/dL (0.1-1.2)   07/08/22  06:16    


 


AST  20 units/L (5-40)   07/08/22  06:16    


 


ALT  27 units/L (7-56)   07/08/22  06:16    


 


Alkaline Phosphatase  47 units/L ()   07/08/22  06:16    


 


Total Protein  6.4 g/dL (6.3-8.2)   07/08/22  06:16    


 


Albumin  4.3 g/dL (3.9-5)   07/08/22  06:16    


 


Albumin/Globulin Ratio  2.0 %  07/08/22  06:16    


 


Triglycerides  142 mg/dL (2-149)   07/08/22  06:16    


 


Cholesterol  144 mg/dL ()   07/08/22  06:16    


 


LDL Cholesterol Direct  96 mg/dL ()   07/08/22  06:16    


 


HDL Cholesterol  37 mg/dL (40-59)  L  07/08/22  06:16    


 


Cholesterol/HDL Ratio  3.89 %  07/08/22  06:16    


 


TSH  0.672 mlU/mL (0.270-4.200)   07/08/22  06:16    














Core Measure Documentation





- Palliative Care


Palliative Care/ Comfort Measures: Not Applicable





- Core Measures


Any of the following diagnoses?: none





Exam





- Constitutional


Vitals: 


                                        











Temp Pulse Resp BP Pulse Ox


 


 98.8 F   83   18   112/76   96 


 


 07/16/22 21:00  07/16/22 21:00  07/16/22 21:00  07/16/22 21:00  07/16/22 21:00











General appearance: Present: no acute distress





- EENT


Eyes: Present: PERRL, EOM intact


ENT: hearing intact, clear oral mucosa





- Neck


Neck: Present: supple, normal ROM





- Respiratory


Respiratory effort: normal





Plan


Activity: advance as tolerated


Weight Bearing Status: Weight Bear as Tolerated


Care Plan Goals: 


Maintain good and stable mental health


Plan of Treatment: 


The patient should be compliant with medications, not to use drugs, and not to 

drink alcohol. The patient understands that if suicidal ideas, homicidal ideas 

or any endangering feeling arise, the patient should seek assistance including, 

but not limited to crisis hotline, and emergency room.


Assessment: 


Schizoaffective Disorder


Follow up with: 


PRIMARY CARE,MD [Primary Care Provider] - 7 Days


Prescriptions: 


traZODone [Desyrel] 150 mg PO QHS #90 tablet


Melatonin [Melatonin 5MG TAB] 10 mg PO QHS #60 tablet


Divalproex Dr [Depakote Dr] 1,000 mg PO BID #120 tablet


FLUoxetine [PROzac] 20 mg PO QDAY #30 capsule


risperiDONE [RisperDAL] 2 mg PO BID #60


buPROPion XL [Wellbutrin XL] 150 mg PO QDAY #30 tablet


OLANzapine [ZyPREXA] 2.5 mg PO QDAY #30 tablet

## 2022-07-19 NOTE — PROGRESS NOTE
Assessment and Plan





- Patient Problems


(1) HTN (hypertension)


Current Visit: Yes   Status: Acute   


Qualifiers: 


   Hypertension type: primary hypertension   Qualified Code(s): I10 - Essential 

(primary) hypertension   


Plan to address problem: 


Monitor blood pressure every shift, continue medical management.








(2) Diabetes


Current Visit: Yes   Status: Acute   


Plan to address problem: 


Consistent carbohydrate diet, Accu-Chek, insulin protocol, hypoglycemia 

protocol.








(3) Bipolar 1 disorder


Current Visit: Yes   Status: Acute   


Plan to address problem: 


Continue medical management, supportive care, behavior change counseling,








(4) Schizophrenia


Current Visit: Yes   Status: Acute   


Qualifiers: 


   Schizophrenia type: unspecified   Qualified Code(s): F20.9 - Schizophrenia, 

unspecified   


Plan to address problem: 


Continue medical management, supportive care.








(5) Advance care planning


Current Visit: Yes   Status: Acute   


Plan to address problem: 


Disease education done, care plan discussed, diagnoses discussed, prognosis 

discussed, patient is full code.  Patient acknowledges understanding and 

agreement with care plan, +30 minutes.








(6) Preventative health care


Current Visit: Yes   Status: Acute   


Plan to address problem: 


Patient counseled regarding balanced diet, increase physical activity at 

discharge, consistent carbohydrate diet, weight reduction, patient instructed to

follow-up with primary care physician regarding all age and risk factor 

appropriate screening test.  +30 minutes.








History


Interval history: 


60 YO Female with Obesity Hypoventilation Syndrome, HTN, DM, Bipolar Disorder, 

Schizophrenia, Schizo-Affective Disorder. Consult placed by Dr. Long for 

medical management.  Patient seen and evaluated in the recreation room.  Patient

appears to be at baseline level of cognition and function.














Hospitalist Physical





- Constitutional


Vitals: 


                                        











Temp Pulse Resp BP Pulse Ox


 


 97.4 F L  82   16   110/63   95 


 


 07/19/22 09:46  07/19/22 10:09  07/19/22 09:46  07/19/22 10:09  07/19/22 09:46











General appearance: Present: no acute distress





- EENT


Eyes: Present: PERRL


ENT: hearing intact





- Neck


Neck: Present: supple





- Respiratory


Respiratory effort: normal


Respiratory: bilateral: CTA





- Cardiovascular


Rhythm: regular


Heart Sounds: Present: S1 & S2





- Extremities


Extremities: no ischemia


Peripheral Pulses: within normal limits





- Abdominal


General gastrointestinal: soft, non-tender, non-distended





- Integumentary


Integumentary: Present: clear, dry





- Psychiatric


Psychiatric: cooperative





- Neurologic


Neurologic: CNII-XII intact





Results





- Labs


CBC & Chem 7: 


                                 07/08/22 06:16





                                 07/08/22 06:16


Labs: 


                             Laboratory Last Values











WBC  4.1 K/mm3 (4.5-11.0)  L  07/08/22  06:16    


 


RBC  4.51 M/mm3 (3.65-5.03)   07/08/22  06:16    


 


Hgb  13.4 gm/dl (10.1-14.3)   07/08/22  06:16    


 


Hct  40.9 % (30.3-42.9)   07/08/22  06:16    


 


MCV  91 fl (79-97)   07/08/22  06:16    


 


MCH  30 pg (28-32)   07/08/22  06:16    


 


MCHC  33 % (30-34)   07/08/22  06:16    


 


RDW  13.3 % (13.2-15.2)   07/08/22  06:16    


 


Plt Count  172 K/mm3 (140-440)   07/08/22  06:16    


 


Lymph % (Auto)  35.4 % (13.4-35.0)  H  07/08/22  06:16    


 


Mono % (Auto)  8.9 % (0.0-7.3)  H  07/08/22  06:16    


 


Eos % (Auto)  2.2 % (0.0-4.3)   07/08/22  06:16    


 


Baso % (Auto)  1.2 % (0.0-1.8)   07/08/22  06:16    


 


Lymph # (Auto)  1.4 K/mm3 (1.2-5.4)   07/08/22  06:16    


 


Mono # (Auto)  0.4 K/mm3 (0.0-0.8)   07/08/22  06:16    


 


Eos # (Auto)  0.1 K/mm3 (0.0-0.4)   07/08/22  06:16    


 


Baso # (Auto)  0.0 K/mm3 (0.0-0.1)   07/08/22  06:16    


 


Seg Neutrophils %  52.3 % (40.0-70.0)   07/08/22  06:16    


 


Seg Neutrophils #  2.1 K/mm3 (1.8-7.7)   07/08/22  06:16    


 


Sodium  138 mmol/L (137-145)   07/08/22  06:16    


 


Potassium  3.8 mmol/L (3.6-5.0)   07/08/22  06:16    


 


Chloride  103.7 mmol/L ()   07/08/22  06:16    


 


Carbon Dioxide  24 mmol/L (22-30)   07/08/22  06:16    


 


Anion Gap  14 mmol/L  07/08/22  06:16    


 


BUN  11 mg/dL (7-17)   07/08/22  06:16    


 


Creatinine  0.6 mg/dL (0.6-1.2)   07/08/22  06:16    


 


Estimated GFR  > 60 ml/min  07/08/22  06:16    


 


BUN/Creatinine Ratio  18 %  07/08/22  06:16    


 


Glucose  206 mg/dL ()  H  07/08/22  06:16    


 


POC Glucose  160 mg/dL ()  H  07/18/22  20:31    


 


Hemoglobin A1c  9.1 % (4-6)  H  07/08/22  06:16    


 


Calcium  8.9 mg/dL (8.4-10.2)   07/08/22  06:16    


 


Total Bilirubin  0.60 mg/dL (0.1-1.2)   07/08/22  06:16    


 


AST  20 units/L (5-40)   07/08/22  06:16    


 


ALT  27 units/L (7-56)   07/08/22  06:16    


 


Alkaline Phosphatase  47 units/L ()   07/08/22  06:16    


 


Total Protein  6.4 g/dL (6.3-8.2)   07/08/22  06:16    


 


Albumin  4.3 g/dL (3.9-5)   07/08/22  06:16    


 


Albumin/Globulin Ratio  2.0 %  07/08/22  06:16    


 


Triglycerides  142 mg/dL (2-149)   07/08/22  06:16    


 


Cholesterol  144 mg/dL ()   07/08/22  06:16    


 


LDL Cholesterol Direct  96 mg/dL ()   07/08/22  06:16    


 


HDL Cholesterol  37 mg/dL (40-59)  L  07/08/22  06:16    


 


Cholesterol/HDL Ratio  3.89 %  07/08/22  06:16    


 


TSH  0.672 mlU/mL (0.270-4.200)   07/08/22  06:16    











Choe/IV: 


                                        





Voiding Method                   Toilet











Active Medications





- Current Medications


Current Medications: 














Generic Name Dose Route Start Last Admin





  Trade Name Freq  PRN Reason Stop Dose Admin


 


Bupropion HCl  150 mg  07/13/22 10:00  07/19/22 10:06





  Bupropion Xl 150 Mg Tab  PO   150 mg





  QDAY YESSICA   Administration


 


Divalproex Sodium  1,000 mg  07/13/22 10:00  07/19/22 10:06





  Divalproex Dr 500 Mg Tab  PO   1,000 mg





  BID YESSICA   Administration


 


Fluoxetine HCl  20 mg  07/09/22 10:00  07/19/22 10:06





  Fluoxetine 20 Mg Cap  PO   20 mg





  QDAY YESSICA   Administration


 


Insulin Human Lispro  0 unit  07/08/22 07:30  07/19/22 17:07





  Insulin Lispro 100 Unit/Ml  SUB-Q   Not Given





  ACHS YESSICA  





  Protocol  


 


Lorazepam  2 mg  07/08/22 19:59  07/17/22 17:21





  Lorazepam 2 Mg/Ml Vial  IM   2 mg





  Q6H PRN   Administration





  Agitation  


 


Melatonin  10 mg  07/18/22 22:00  07/18/22 21:07





  Melatonin 5 Mg Tab  PO   10 mg





  QHS YESSICA   Administration


 


Metformin HCl  500 mg  07/09/22 08:00  07/19/22 17:50





  Metformin 500 Mg Tab  PO   Not Given





  BIDDIAB YESSICA  


 


Olanzapine  2.5 mg  07/16/22 11:00  07/19/22 10:07





  Olanzapine 2.5 Mg Tab  PO   2.5 mg





  QDAY YESSICA   Administration


 


Propranolol HCl  10 mg  07/08/22 22:00  07/19/22 10:09





  Propranolol 10 Mg Tab  PO   10 mg





  Q12HR YESSICA   Administration


 


Risperidone  2 mg  07/08/22 22:00  07/19/22 10:06





  Risperidone 1 Mg Tab  PO   2 mg





  BID YESSICA   Administration


 


Trazodone HCl  150 mg  07/16/22 22:00  07/18/22 21:06





  Trazodone 50 Mg Tab  PO   150 mg





  QHS YESSICA   Administration














Nutrition/Malnutrition Assess





- Dietary Evaluation


Nutrition/Malnutrition Findings: 


                                        





Nutrition Notes                                            Start:  07/15/22 19

:14


Freq:                                                      Status: Active       




Protocol:                                                                       




 Document     07/15/22 19:14  ZAHRAA  (Rec: 07/15/22 19:22  ZAHRAA  [id number])


 Nutrition Notes


     Need for Assessment generated from:         LOS


     Initial or Follow up                        Assessment


     Other Pertinent Diagnosis                   Bipolar Disorder,


                                                 Schizoaffective Disorder.


     Current Diet                                Cardiac/Consistent


                                                 Carbohydrates Diet (since B 07 /07).


     Labs/Tests                                  07/15: N/A.


     Pertinent Medications                       07/15: Nutritionally


                                                 unremarkable.


     Height                                      5 ft 7 in


     Weight                                      99.7 kg


     Ideal Body Weight (kg)                      61.36


     BMI                                         34.4


     Intake Prior to Admission                   Good


     Weight change and time frame                Pt denies having loss body


                                                 weight PTA.


     Weight Status                               Obese


     Subjective/Other Information                RD consult for LOS assessment.


                                                 Pt's PO intake of meals has


                                                 been Good, (100%) and well


                                                 tolerated, according to ADL


                                                 notes.


                                                 Pt is on Room Air, O2


                                                 saturation @ 100%, according


                                                 to Vital Signs notes.


                                                 Pt presents unspecified


                                                 bruises as signs of concern


                                                 for skin risk at this time,


                                                 accoreding to Physical


                                                 Assessment History notes.


     Percent of energy/protein needs met:        Prescribed Cardiac/Consistent


                                                 Carbohydrates Diet provides


                                                 for energy/protein needs (1,


                                                 977 Kcal/86 g) during LOS.


     Burn                                        Absent


     Trauma                                      Absent


     GI Symptoms                                 None


     Food Allergy                                No


     Skin Integrity/Comment                      Unspecified bruises.


     Current % PO                                Good (%)


     Minimum of two criteria                     No


     Fluid Accumulation                          N/A


     Reduced  Strength                       N/A (non-severe)


     Protein-Calorie Malnutrition                N\A


     #1


      Nutrition Diagnosis                        No nutrition diagnosis at this


                                                 time


     Is patient on ventilator?                   No


     Is Patient Ambulatory and/or Out of Bed     Yes


     REE-([facility]-ambulatory/OOB) [     2086.019


      [email or web address]]                               


     Kcal/Kg value to use for calculation        18


     Approximate Energy Requirements Using       1795


      kcal/Kg                                    


     Calculation Used for Recommendations        Kcal/kg


     Additional Notes                            Protein: 0.8-1 g/Kg AdjBW; 65-


                                                 81 g/day.


                                                 Fluids: 1 ml/Kcal, or as per


                                                 MD.


 Nutrition Intervention


     Change Diet Order:                          Continue Cardiac/Consistent


                                                 Carbohydrates Diet.


     Revisit per MD consult or patient           Sign Off


      request:                                   


     Additional Comments                         Continue monitoring food


                                                 tolerance, %PO intake of meals


                                                 , and BM.
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
## 2022-07-20 NOTE — DISCHARGE SUMMARY
Providers





- Providers


Date of Admission: 


07/08/22 01:09





Date of discharge: 07/20/22


Attending physician: 


SOSA QUINTERO MD





                                        





07/07/22 23:44


Consult to Physician [CONS] Routine 


   Comment: 


   Consulting Provider: BRAYDEN MENDOZA


   Physician Instructions: 


   Reason For Exam: H & P











Primary care physician: 


PRIMARY CARE MD








Hospitalization


Reason for admission: agitation


Admitting Diagnosis: F25.9 - SCHIZOAFFECTIVE DISORDER, UNSPECIFIED


Condition: Stable


Hospital course: 


The patient was provided inpatient psychiatric treatment with safe and 

supportive care, medication adjustment, adverse effect monitoring, medical 

evaluations, medical treatments, assessment and psycho-education. The patient's 

mood, cognition, behavior, moral support are improved and stabilized. St the 

time of discharge, the patient had no endangering behavior and no debilitating 

adverse effects. The patient agreed on potential consequences of no treatment 

and gave informed consent. 





Disposition: 01 HOME / SELF CARE / HOMELESS


Time spent for discharge: 35


Allergies/Adverse Reactions: 


                                    Allergies





No Known Allergies Allergy (Verified 07/06/22 11:13)


   








Vital Signs: 


                                Last Vital Signs











Temp  97.4 F L  07/19/22 09:46


 


Pulse  63   07/20/22 09:01


 


Resp  16   07/19/22 09:46


 


BP  110/69   07/20/22 09:01


 


Pulse Ox  95   07/19/22 09:46











Last Lab: 


                             Laboratory Last Values











WBC  4.1 K/mm3 (4.5-11.0)  L  07/08/22  06:16    


 


RBC  4.51 M/mm3 (3.65-5.03)   07/08/22  06:16    


 


Hgb  13.4 gm/dl (10.1-14.3)   07/08/22  06:16    


 


Hct  40.9 % (30.3-42.9)   07/08/22  06:16    


 


MCV  91 fl (79-97)   07/08/22  06:16    


 


MCH  30 pg (28-32)   07/08/22  06:16    


 


MCHC  33 % (30-34)   07/08/22  06:16    


 


RDW  13.3 % (13.2-15.2)   07/08/22  06:16    


 


Plt Count  172 K/mm3 (140-440)   07/08/22  06:16    


 


Lymph % (Auto)  35.4 % (13.4-35.0)  H  07/08/22  06:16    


 


Mono % (Auto)  8.9 % (0.0-7.3)  H  07/08/22  06:16    


 


Eos % (Auto)  2.2 % (0.0-4.3)   07/08/22  06:16    


 


Baso % (Auto)  1.2 % (0.0-1.8)   07/08/22  06:16    


 


Lymph # (Auto)  1.4 K/mm3 (1.2-5.4)   07/08/22  06:16    


 


Mono # (Auto)  0.4 K/mm3 (0.0-0.8)   07/08/22  06:16    


 


Eos # (Auto)  0.1 K/mm3 (0.0-0.4)   07/08/22  06:16    


 


Baso # (Auto)  0.0 K/mm3 (0.0-0.1)   07/08/22  06:16    


 


Seg Neutrophils %  52.3 % (40.0-70.0)   07/08/22  06:16    


 


Seg Neutrophils #  2.1 K/mm3 (1.8-7.7)   07/08/22  06:16    


 


Sodium  138 mmol/L (137-145)   07/08/22  06:16    


 


Potassium  3.8 mmol/L (3.6-5.0)   07/08/22  06:16    


 


Chloride  103.7 mmol/L ()   07/08/22  06:16    


 


Carbon Dioxide  24 mmol/L (22-30)   07/08/22  06:16    


 


Anion Gap  14 mmol/L  07/08/22  06:16    


 


BUN  11 mg/dL (7-17)   07/08/22  06:16    


 


Creatinine  0.6 mg/dL (0.6-1.2)   07/08/22  06:16    


 


Estimated GFR  > 60 ml/min  07/08/22  06:16    


 


BUN/Creatinine Ratio  18 %  07/08/22  06:16    


 


Glucose  206 mg/dL ()  H  07/08/22  06:16    


 


POC Glucose  160 mg/dL ()  H  07/18/22  20:31    


 


Hemoglobin A1c  9.1 % (4-6)  H  07/08/22  06:16    


 


Calcium  8.9 mg/dL (8.4-10.2)   07/08/22  06:16    


 


Total Bilirubin  0.60 mg/dL (0.1-1.2)   07/08/22  06:16    


 


AST  20 units/L (5-40)   07/08/22  06:16    


 


ALT  27 units/L (7-56)   07/08/22  06:16    


 


Alkaline Phosphatase  47 units/L ()   07/08/22  06:16    


 


Total Protein  6.4 g/dL (6.3-8.2)   07/08/22  06:16    


 


Albumin  4.3 g/dL (3.9-5)   07/08/22  06:16    


 


Albumin/Globulin Ratio  2.0 %  07/08/22  06:16    


 


Triglycerides  142 mg/dL (2-149)   07/08/22  06:16    


 


Cholesterol  144 mg/dL ()   07/08/22  06:16    


 


LDL Cholesterol Direct  96 mg/dL ()   07/08/22  06:16    


 


HDL Cholesterol  37 mg/dL (40-59)  L  07/08/22  06:16    


 


Cholesterol/HDL Ratio  3.89 %  07/08/22  06:16    


 


TSH  0.672 mlU/mL (0.270-4.200)   07/08/22  06:16    














Core Measure Documentation





- Palliative Care


Palliative Care/ Comfort Measures: Not Applicable





- Core Measures


Any of the following diagnoses?: none





Exam





- Constitutional


Vitals: 


                                        











Temp Pulse Resp BP Pulse Ox


 


 97.4 F L  63   16   110/69   95 


 


 07/19/22 09:46  07/20/22 09:01  07/19/22 09:46  07/20/22 09:01  07/19/22 09:46











General appearance: Present: no acute distress





- EENT


Eyes: Present: PERRL, EOM intact


ENT: hearing intact, clear oral mucosa





- Neck


Neck: Present: supple, normal ROM





- Respiratory


Respiratory effort: normal





Plan


Activity: advance as tolerated, no driving until cleared by PCP


Weight Bearing Status: Weight Bear as Tolerated


Care Plan Goals: 


Maintain good and stable mental health


Plan of Treatment: 


The patient should be compliant with medications, not to use drugs, and not to 

drink alcohol. The patient understands that if suicidal ideas, homicidal ideas 

or any endangering feeling arise, the patient should seek assistance including, 

but not limited to crisis hotline, and emergency room.


Assessment: 


Schizoaffective Disorder


Follow up with: 


PRIMARY CARE,MD [Primary Care Provider] - 7 Days


Prescriptions: 


traZODone [Desyrel] 150 mg PO QHS #90 tablet


Melatonin [Melatonin 5MG TAB] 10 mg PO QHS #60 tablet


Divalproex Dr [Depakote Dr] 1,000 mg PO BID #120 tablet


FLUoxetine [PROzac] 20 mg PO QDAY #30 capsule


risperiDONE [RisperDAL] 2 mg PO BID #60


buPROPion XL [Wellbutrin XL] 150 mg PO QDAY #30 tablet


OLANzapine [ZyPREXA] 2.5 mg PO QDAY #30 tablet